# Patient Record
Sex: FEMALE | Race: WHITE | NOT HISPANIC OR LATINO | Employment: FULL TIME | URBAN - METROPOLITAN AREA
[De-identification: names, ages, dates, MRNs, and addresses within clinical notes are randomized per-mention and may not be internally consistent; named-entity substitution may affect disease eponyms.]

---

## 2019-09-26 ENCOUNTER — OFFICE VISIT (OUTPATIENT)
Dept: URGENT CARE | Facility: CLINIC | Age: 29
End: 2019-09-26
Payer: COMMERCIAL

## 2019-09-26 VITALS
HEIGHT: 65 IN | WEIGHT: 145 LBS | HEART RATE: 90 BPM | TEMPERATURE: 98 F | BODY MASS INDEX: 24.16 KG/M2 | RESPIRATION RATE: 18 BRPM | OXYGEN SATURATION: 97 % | SYSTOLIC BLOOD PRESSURE: 115 MMHG | DIASTOLIC BLOOD PRESSURE: 76 MMHG

## 2019-09-26 DIAGNOSIS — R30.0 DYSURIA: ICD-10-CM

## 2019-09-26 DIAGNOSIS — Z78.9 BREASTFEEDING (INFANT): ICD-10-CM

## 2019-09-26 DIAGNOSIS — N30.90 CYSTITIS: Primary | ICD-10-CM

## 2019-09-26 DIAGNOSIS — J06.9 UPPER RESPIRATORY TRACT INFECTION, UNSPECIFIED TYPE: ICD-10-CM

## 2019-09-26 LAB
BILIRUB UR QL STRIP: NEGATIVE
GLUCOSE UR QL STRIP: NEGATIVE
KETONES UR QL STRIP: NEGATIVE
LEUKOCYTE ESTERASE UR QL STRIP: NEGATIVE
PH, POC UA: NORMAL (ref 5–8)
POC BLOOD, URINE: NEGATIVE
POC NITRATES, URINE: NEGATIVE
PROT UR QL STRIP: NEGATIVE
SP GR UR STRIP: NORMAL (ref 1–1.03)
UROBILINOGEN UR STRIP-ACNC: NORMAL (ref 0.1–1.1)

## 2019-09-26 PROCEDURE — 3008F PR BODY MASS INDEX (BMI) DOCUMENTED: ICD-10-PCS | Mod: CPTII,S$GLB,, | Performed by: FAMILY MEDICINE

## 2019-09-26 PROCEDURE — 99000 SPECIMEN HANDLING OFFICE-LAB: CPT | Mod: S$GLB,,, | Performed by: FAMILY MEDICINE

## 2019-09-26 PROCEDURE — 87086 URINE CULTURE/COLONY COUNT: CPT

## 2019-09-26 PROCEDURE — 99214 OFFICE O/P EST MOD 30 MIN: CPT | Mod: S$GLB,,, | Performed by: FAMILY MEDICINE

## 2019-09-26 PROCEDURE — 81003 POCT URINALYSIS, DIPSTICK, AUTOMATED, W/O SCOPE: ICD-10-PCS | Mod: QW,S$GLB,, | Performed by: FAMILY MEDICINE

## 2019-09-26 PROCEDURE — 3008F BODY MASS INDEX DOCD: CPT | Mod: CPTII,S$GLB,, | Performed by: FAMILY MEDICINE

## 2019-09-26 PROCEDURE — 99214 PR OFFICE/OUTPT VISIT, EST, LEVL IV, 30-39 MIN: ICD-10-PCS | Mod: S$GLB,,, | Performed by: FAMILY MEDICINE

## 2019-09-26 PROCEDURE — 99000 PR SPECIMEN HANDLING,DR OFF->LAB: ICD-10-PCS | Mod: S$GLB,,, | Performed by: FAMILY MEDICINE

## 2019-09-26 PROCEDURE — 81003 URINALYSIS AUTO W/O SCOPE: CPT | Mod: QW,S$GLB,, | Performed by: FAMILY MEDICINE

## 2019-09-26 RX ORDER — NITROFURANTOIN (MACROCRYSTALS) 100 MG/1
100 CAPSULE ORAL EVERY 12 HOURS
Qty: 14 CAPSULE | Refills: 0 | Status: SHIPPED | OUTPATIENT
Start: 2019-09-26 | End: 2019-10-03

## 2019-09-26 NOTE — PROGRESS NOTES
"Subjective:       Patient ID: Tasia Segundo is a 29 y.o. female.    Vitals:  height is 5' 5" (1.651 m) and weight is 65.8 kg (145 lb). Her oral temperature is 98.3 °F (36.8 °C). Her blood pressure is 115/76 and her pulse is 90. Her respiration is 18 and oxygen saturation is 97%.     Chief Complaint: Dysuria    Pt c/o of sinus pain, pressure,uti that began Saturday. Pt states that she has frequency, urgency, pain with urination.     Dysuria    This is a new problem. The current episode started in the past 7 days. The problem has been unchanged. The quality of the pain is described as burning. The pain is at a severity of 6/10. The pain is moderate. There has been no fever. She is sexually active. There is no history of pyelonephritis. Associated symptoms include frequency, nausea and urgency. Pertinent negatives include no chills, hematuria, vomiting or rash. She has tried nothing for the symptoms.       Constitution: Negative for chills, sweating, fatigue and fever.   HENT: Positive for sinus pain and sinus pressure. Negative for ear pain, congestion, sore throat and voice change.    Neck: Positive for painful lymph nodes.   Eyes: Negative for eye redness.   Respiratory: Negative for chest tightness, cough, sputum production, bloody sputum, COPD, shortness of breath, stridor, wheezing and asthma.    Gastrointestinal: Positive for abdominal trauma and nausea. Negative for abdominal pain and vomiting.   Genitourinary: Positive for frequency, urgency and pelvic pain. Negative for dysuria, urine decreased, hematuria, history of kidney stones, painful menstruation, irregular menstruation, missed menses, heavy menstrual bleeding, ovarian cysts, genital trauma, vaginal pain, vaginal discharge, vaginal bleeding, vaginal odor, painful intercourse, genital sore and painful ejaculation.   Musculoskeletal: Positive for muscle ache. Negative for back pain.   Skin: Negative for rash and lesion.   Allergic/Immunologic: " Negative for seasonal allergies and asthma.   Hematologic/Lymphatic: Positive for swollen lymph nodes.       Objective:      Physical Exam   Constitutional: She is oriented to person, place, and time. She appears well-developed and well-nourished.   HENT:   Head: Normocephalic and atraumatic.   Right Ear: External ear normal.   Left Ear: External ear normal.   Nose: Nose normal.   Mouth/Throat: Oropharynx is clear and moist.   Eyes: Pupils are equal, round, and reactive to light. Conjunctivae and EOM are normal.   Neck: Normal range of motion. Neck supple. No JVD present. No thyromegaly present.   Cardiovascular: Normal rate, regular rhythm, normal heart sounds and intact distal pulses.   No murmur heard.  Pulmonary/Chest: Effort normal and breath sounds normal.   Abdominal: Soft. Bowel sounds are normal. She exhibits no mass. There is tenderness (minor rlq and suprapubic tenderness).   Musculoskeletal: Normal range of motion. She exhibits no edema.   Lymphadenopathy:     She has no cervical adenopathy.   Neurological: She is alert and oriented to person, place, and time. She has normal reflexes.   Skin: Skin is warm and dry.   Psychiatric: She has a normal mood and affect. Her behavior is normal. Judgment and thought content normal.   Nursing note and vitals reviewed.      Assessment:       1. Cystitis    2. Dysuria    3. Upper respiratory tract infection, unspecified type    4. Breastfeeding (infant)        Plan:         Cystitis  -     nitrofurantoin (MACRODANTIN) 100 MG capsule; Take 1 capsule (100 mg total) by mouth every 12 (twelve) hours. for 7 days  Dispense: 14 capsule; Refill: 0    Dysuria  -     POCT Urinalysis, Dipstick, Automated, W/O Scope  -     Urine culture  -     nitrofurantoin (MACRODANTIN) 100 MG capsule; Take 1 capsule (100 mg total) by mouth every 12 (twelve) hours. for 7 days  Dispense: 14 capsule; Refill: 0    Upper respiratory tract infection, unspecified type    Breastfeeding  (infant)      Patient Instructions       Anatomy of the Female Urinary Tract  Your urinary tract helps get rid of urine (your bodys liquid waste). The kidneys collect chemicals and water your body doesnt need. This is turned into urine. Urine travels out of the kidneys through the ureters to the bladder. The bladder holds urine until youre ready to release it. The urethra carries urine from the bladder out of the body. The main sphincter muscle circles the mid-urethra.      Front view of female urinary tract.     Date Last Reviewed: 1/1/2017 © 2000-2017 [employer]. [address] [place] [id number]. All rights reserved. This information is not intended as a substitute for professional medical care. Always follow your healthcare professional's instructions.        Viral Upper Respiratory Illness (Adult)  You have a viral upper respiratory illness (URI), which is another term for the common cold. This illness is contagious during the first few days. It is spread through the air by coughing and sneezing. It may also be spread by direct contact (touching the sick person and then touching your own eyes, nose, or mouth). Frequent handwashing will decrease risk of spread. Most viral illnesses go away within 7 to 10 days with rest and simple home remedies. Sometimes the illness may last for several weeks. Antibiotics will not kill a virus, and they are generally not prescribed for this condition.    Home care  · If symptoms are severe, rest at home for the first 2 to 3 days. When you resume activity, don't let yourself get too tired.  · Avoid being exposed to cigarette smoke (yours or others).  · You may use acetaminophen or ibuprofen to control pain and fever, unless another medicine was prescribed. (Note: If you have chronic liver or kidney disease, have ever had a stomach ulcer or gastrointestinal bleeding, or are taking blood-thinning medicines, talk with your healthcare provider before using  these medicines.) Aspirin should never be given to anyone under 18 years of age who is ill with a viral infection or fever. It may cause severe liver or brain damage.  · Your appetite may be poor, so a light diet is fine. Avoid dehydration by drinking 6 to 8 glasses of fluids per day (water, soft drinks, juices, tea, or soup). Extra fluids will help loosen secretions in the nose and lungs.  · Over-the-counter cold medicines will not shorten the length of time youre sick, but they may be helpful for the following symptoms: cough, sore throat, and nasal and sinus congestion. (Note: Do not use decongestants if you have high blood pressure.)  Follow-up care  Follow up with your healthcare provider, or as advised.  When to seek medical advice  Call your healthcare provider right away if any of these occur:  · Cough with lots of colored sputum (mucus)  · Severe headache; face, neck, or ear pain  · Difficulty swallowing due to throat pain  · Fever of 100.4°F (38°C)  Call 911, or get immediate medical care  Call emergency services right away if any of these occur:  · Chest pain, shortness of breath, wheezing, or difficulty breathing  · Coughing up blood  · Inability to swallow due to throat pain  Date Last Reviewed: 9/13/2015 © 2000-2017 [employer]. [address] [place] [address], [place] [address]. All rights reserved. This information is not intended as a substitute for professional medical care. Always follow your healthcare professional's instructions.

## 2019-09-26 NOTE — PATIENT INSTRUCTIONS
Anatomy of the Female Urinary Tract  Your urinary tract helps get rid of urine (your bodys liquid waste). The kidneys collect chemicals and water your body doesnt need. This is turned into urine. Urine travels out of the kidneys through the ureters to the bladder. The bladder holds urine until youre ready to release it. The urethra carries urine from the bladder out of the body. The main sphincter muscle circles the mid-urethra.      Front view of female urinary tract.     Date Last Reviewed: 1/1/2017 © 2000-2017 [employer]. [address] [place] [id number]. All rights reserved. This information is not intended as a substitute for professional medical care. Always follow your healthcare professional's instructions.        Viral Upper Respiratory Illness (Adult)  You have a viral upper respiratory illness (URI), which is another term for the common cold. This illness is contagious during the first few days. It is spread through the air by coughing and sneezing. It may also be spread by direct contact (touching the sick person and then touching your own eyes, nose, or mouth). Frequent handwashing will decrease risk of spread. Most viral illnesses go away within 7 to 10 days with rest and simple home remedies. Sometimes the illness may last for several weeks. Antibiotics will not kill a virus, and they are generally not prescribed for this condition.    Home care  · If symptoms are severe, rest at home for the first 2 to 3 days. When you resume activity, don't let yourself get too tired.  · Avoid being exposed to cigarette smoke (yours or others).  · You may use acetaminophen or ibuprofen to control pain and fever, unless another medicine was prescribed. (Note: If you have chronic liver or kidney disease, have ever had a stomach ulcer or gastrointestinal bleeding, or are taking blood-thinning medicines, talk with your healthcare provider before using these medicines.) Aspirin should never  be given to anyone under 18 years of age who is ill with a viral infection or fever. It may cause severe liver or brain damage.  · Your appetite may be poor, so a light diet is fine. Avoid dehydration by drinking 6 to 8 glasses of fluids per day (water, soft drinks, juices, tea, or soup). Extra fluids will help loosen secretions in the nose and lungs.  · Over-the-counter cold medicines will not shorten the length of time youre sick, but they may be helpful for the following symptoms: cough, sore throat, and nasal and sinus congestion. (Note: Do not use decongestants if you have high blood pressure.)  Follow-up care  Follow up with your healthcare provider, or as advised.  When to seek medical advice  Call your healthcare provider right away if any of these occur:  · Cough with lots of colored sputum (mucus)  · Severe headache; face, neck, or ear pain  · Difficulty swallowing due to throat pain  · Fever of 100.4°F (38°C)  Call 911, or get immediate medical care  Call emergency services right away if any of these occur:  · Chest pain, shortness of breath, wheezing, or difficulty breathing  · Coughing up blood  · Inability to swallow due to throat pain  Date Last Reviewed: 9/13/2015  © 3035-4521 [employer]. [address] [place] [address], [place] [address]. All rights reserved. This information is not intended as a substitute for professional medical care. Always follow your healthcare professional's instructions.

## 2019-09-27 LAB — BACTERIA UR CULT: NO GROWTH

## 2019-09-28 ENCOUNTER — OFFICE VISIT (OUTPATIENT)
Dept: URGENT CARE | Facility: CLINIC | Age: 29
End: 2019-09-28
Payer: COMMERCIAL

## 2019-09-28 VITALS
TEMPERATURE: 100 F | HEIGHT: 65 IN | OXYGEN SATURATION: 99 % | RESPIRATION RATE: 20 BRPM | BODY MASS INDEX: 24.16 KG/M2 | HEART RATE: 102 BPM | WEIGHT: 145 LBS | SYSTOLIC BLOOD PRESSURE: 119 MMHG | DIASTOLIC BLOOD PRESSURE: 77 MMHG

## 2019-09-28 DIAGNOSIS — R11.0 NAUSEA: ICD-10-CM

## 2019-09-28 DIAGNOSIS — R52 GENERALIZED BODY ACHES: Primary | ICD-10-CM

## 2019-09-28 DIAGNOSIS — R10.9 ABDOMINAL CRAMPING: ICD-10-CM

## 2019-09-28 LAB
BILIRUB UR QL STRIP: NEGATIVE
CTP QC/QA: YES
FLUAV AG NPH QL: NEGATIVE
FLUBV AG NPH QL: NEGATIVE
GLUCOSE UR QL STRIP: NEGATIVE
KETONES UR QL STRIP: NEGATIVE
LEUKOCYTE ESTERASE UR QL STRIP: NEGATIVE
PH, POC UA: 6.5 (ref 5–8)
POC BLOOD, URINE: POSITIVE
POC NITRATES, URINE: NEGATIVE
PROT UR QL STRIP: NEGATIVE
SP GR UR STRIP: 1 (ref 1–1.03)
UROBILINOGEN UR STRIP-ACNC: NORMAL (ref 0.1–1.1)

## 2019-09-28 PROCEDURE — 81003 URINALYSIS AUTO W/O SCOPE: CPT | Mod: QW,S$GLB,, | Performed by: PHYSICIAN ASSISTANT

## 2019-09-28 PROCEDURE — 3008F BODY MASS INDEX DOCD: CPT | Mod: CPTII,S$GLB,, | Performed by: PHYSICIAN ASSISTANT

## 2019-09-28 PROCEDURE — 3008F PR BODY MASS INDEX (BMI) DOCUMENTED: ICD-10-PCS | Mod: CPTII,S$GLB,, | Performed by: PHYSICIAN ASSISTANT

## 2019-09-28 PROCEDURE — 81003 POCT URINALYSIS, DIPSTICK, AUTOMATED, W/O SCOPE: ICD-10-PCS | Mod: QW,S$GLB,, | Performed by: PHYSICIAN ASSISTANT

## 2019-09-28 PROCEDURE — 87804 INFLUENZA ASSAY W/OPTIC: CPT | Mod: QW,S$GLB,, | Performed by: PHYSICIAN ASSISTANT

## 2019-09-28 PROCEDURE — 87804 POCT INFLUENZA A/B: ICD-10-PCS | Mod: 59,QW,S$GLB, | Performed by: PHYSICIAN ASSISTANT

## 2019-09-28 PROCEDURE — 99214 PR OFFICE/OUTPT VISIT, EST, LEVL IV, 30-39 MIN: ICD-10-PCS | Mod: 25,S$GLB,, | Performed by: PHYSICIAN ASSISTANT

## 2019-09-28 PROCEDURE — 99214 OFFICE O/P EST MOD 30 MIN: CPT | Mod: 25,S$GLB,, | Performed by: PHYSICIAN ASSISTANT

## 2019-09-28 RX ORDER — ONDANSETRON 4 MG/1
4 TABLET, FILM COATED ORAL EVERY 8 HOURS PRN
Qty: 30 TABLET | Refills: 0 | Status: SHIPPED | OUTPATIENT
Start: 2019-09-28

## 2019-09-28 RX ORDER — HYOSCYAMINE SULFATE 0.125 MG
125 TABLET ORAL EVERY 4 HOURS PRN
Qty: 30 TABLET | Refills: 0 | Status: SHIPPED | OUTPATIENT
Start: 2019-09-28 | End: 2019-10-05

## 2019-09-28 NOTE — PATIENT INSTRUCTIONS
"  Viral Syndrome (Adult)  A viral illness may cause a number of symptoms. The symptoms depend on the part of the body that the virus affects. If it settles in your nose, throat, and lungs, it may cause cough, sore throat, congestion, and sometimes headache. If it settles in your stomach and intestinal tract, it may cause vomiting and diarrhea. Sometimes it causes vague symptoms like "aching all over," feeling tired, loss of appetite, or fever.  A viral illness usually lasts 1 to 2 weeks, but sometimes it lasts longer. In some cases, a more serious infection can look like a viral syndrome in the first few days of the illness. You may need another exam and additional tests to know the difference. Watch for the warning signs listed below.  Home care  Follow these guidelines for taking care of yourself at home:  · If symptoms are severe, rest at home for the first 2 to 3 days.  · Stay away from cigarette smoke - both your smoke and the smoke from others.  · You may use over-the-counter acetaminophen or ibuprofen for fever, muscle aching, and headache, unless another medicine was prescribed for this. If you have chronic liver or kidney disease or ever had a stomach ulcer or GI bleeding, talk with your doctor before using these medicines. No one who is younger than 18 and ill with a fever should take aspirin. It may cause severe disease or death.  · Your appetite may be poor, so a light diet is fine. Avoid dehydration by drinking 8 to 12 8-ounce glasses of fluids each day. This may include water; orange juice; lemonade; apple, grape, and cranberry juice; clear fruit drinks; electrolyte replacement and sports drinks; and decaffeinated teas and coffee. If you have been diagnosed with a kidney disease, ask your doctor how much and what types of fluids you should drink to prevent dehydration. If you have kidney disease, drinking too much fluid can cause it build up in the your body and be dangerous to your " health.  · Over-the-counter remedies won't shorten the length of the illness but may be helpful for cough, sore throat; and nasal and sinus congestion. Don't use decongestants if you have high blood pressure.  Follow-up care  Follow up with your healthcare provider if you do not improve over the next week.  Call 911  Get emergency medical care if any of the following occur:  · Convulsion  · Feeling weak, dizzy, or like you are going to faint  · Chest pain, shortness of breath, wheezing, or difficulty breathing  When to seek medical advice  Call your healthcare provider right away if any of these occur:  · Cough with lots of colored sputum (mucus) or blood in your sputum  · Chest pain, shortness of breath, wheezing, or difficulty breathing  · Severe headache; face, neck, or ear pain  · Severe, constant pain in the lower right side of your belly (abdominal)  · Continued vomiting (cant keep liquids down)  · Frequent diarrhea (more than 5 times a day); blood (red or black color) or mucus in diarrhea  · Feeling weak, dizzy, or like you are going to faint  · Extreme thirst  · Fever of 100.4°F (38°C) or higher, or as directed by your healthcare provider  Date Last Reviewed: 9/25/2015  © 0150-7324 [employer]. [address] [place] [address], [place] [address]. All rights reserved. This information is not intended as a substitute for professional medical care. Always follow your healthcare professional's instructions.      Please follow up with your Primary care provider within 2-5 days if your signs and symptoms have not resolved or worsen.     If your condition worsens or fails to improve we recommend that you receive another evaluation at the emergency room immediately or contact your primary medical clinic to discuss your concerns.   You must understand that you have received an Urgent Care treatment only and that you may be released before all of your medical problems are known or treated. You, the patient, will  "arrange for follow up care as instructed.     RED FLAGS/WARNING SYMPTOMS DISCUSSED WITH PATIENT THAT WOULD WARRANT EMERGENT MEDICAL ATTENTION. PATIENT VERBALIZED UNDERSTANDING.       Bladder Infection, Female (Adult)    Urine is normally doesn't have any bacteria in it. But bacteria can get into the urinary tract from the skin around the rectum. Or they can travel in the blood from elsewhere in the body. Once they are in your urinary tract, they can cause infection in the urethra (urethritis), the bladder (cystitis), or the kidneys (pyelonephritis).  The most common place for an infection is in the bladder. This is called a bladder infection. This is one of the most common infections in women. Most bladder infections are easily treated. They are not serious unless the infection spreads to the kidney.  The phrases "bladder infection," "UTI," and "cystitis" are often used to describe the same thing. But they are not always the same. Cystitis is an inflammation of the bladder. The most common cause of cystitis is an infection.  Symptoms  The infection causes inflammation in the urethra and bladder. This causes many of the symptoms. The most common symptoms of a bladder infection are:  · Pain or burning when urinating  · Having to urinate more often than usual  · Urgent need to urinate  · Only a small amount of urine comes out  · Blood in urine  · Abdominal discomfort. This is usually in the lower abdomen above the pubic bone.  · Cloudy urine  · Strong- or bad-smelling urine  · Unable to urinate (urinary retention)  · Unable to hold urine in (urinary incontinence)  · Fever  · Loss of appetite  · Confusion (in older adults)  Causes  Bladder infections are not contagious. You can't get one from someone else, from a toilet seat, or from sharing a bath.  The most common cause of bladder infections is bacteria from the bowels. The bacteria get onto the skin around the opening of the urethra. From there, they can get into the " urine and travel up to the bladder, causing inflammation and infection. This usually happens because of:  · Wiping improperly after urinating. Always wipe from front to back.  · Bowel incontinence  · Pregnancy  · Procedures such as having a catheter inserted  · Older age  · Not emptying your bladder. This can allow bacteria a chance to grow in your urine.  · Dehydration  · Constipation  · Sex  · Use of a diaphragm for birth control   Treatment  Bladder infections are diagnosed by a urine test. They are treated with antibiotics and usually clear up quickly without complications. Treatment helps prevent a more serious kidney infection.  Medicines  Medicines can help in the treatment of a bladder infection:  · Take antibiotics until they are used up, even if you feel better. It is important to finish them to make sure the infection has cleared.  · You can use acetaminophen or ibuprofen for pain, fever, or discomfort, unless another medicine was prescribed. If you have chronic liver or kidney disease, talk with your healthcare provider before using these medicines. Also talk with your provider if you've ever had a stomach ulcer or gastrointestinal bleeding, or are taking blood-thinner medicines.  · If you are given phenazopydridine to reduce burning with urination, it will cause your urine to become a bright orange color. This can stain clothing.  Care and prevention  These self-care steps can help prevent future infections:  · Drink plenty of fluids to prevent dehydration and flush out your bladder. Do this unless you must restrict fluids for other health reasons, or your doctor told you not to.  · Proper cleaning after going to the bathroom is important. Wipe from front to back after using the toilet to prevent the spread of bacteria.  · Urinate more often. Don't try to hold urine in for a long time.  · Wear loose-fitting clothes and cotton underwear. Avoid tight-fitting pants.  · Improve your diet and prevent  constipation. Eat more fresh fruit and vegetables, and fiber, and less junk and fatty foods.  · Avoid sex until your symptoms are gone.  · Avoid caffeine, alcohol, and spicy foods. These can irritate your bladder.  · Urinate right after intercourse to flush out your bladder.  · If you use birth control pills and have frequent bladder infections, discuss it with your doctor.  Follow-up care  Call your healthcare provider if all symptoms are not gone after 3 days of treatment. This is especially important if you have repeat infections.  If a culture was done, you will be told if your treatment needs to be changed. If directed, you can call to find out the results.  If X-rays were done, you will be told if the results will affect your treatment.  Call 911  Call 911 if any of the following occur:  · Trouble breathing  · Hard to wake up or confusion  · Fainting or loss of consciousness  · Rapid heart rate  When to seek medical advice  Call your healthcare provider right away if any of these occur:  · Fever of 100.4ºF (38.0ºC) or higher, or as directed by your healthcare provider  · Symptoms are not better by the third day of treatment  · Back or belly (abdominal) pain that gets worse  · Repeated vomiting, or unable to keep medicine down  · Weakness or dizziness  · Vaginal discharge  · Pain, redness, or swelling in the outer vaginal area (labia)  Date Last Reviewed: 10/1/2016  © 3815-2991 [employer]. [address] [place] [address], [place] [address]. All rights reserved. This information is not intended as a substitute for professional medical care. Always follow your healthcare professional's instructions.

## 2019-09-28 NOTE — PROGRESS NOTES
"Subjective:       Patient ID: Tasia Segundo is a 29 y.o. female.    Vitals:  height is 5' 5" (1.651 m) and weight is 65.8 kg (145 lb). Her tympanic temperature is 99.7 °F (37.6 °C). Her blood pressure is 119/77 and her pulse is 102. Her respiration is 20 and oxygen saturation is 99%.     Chief Complaint: Abdominal Pain and Headache    Pt reports she saw her PCP 2 days ago with body aches, abdominal pain, nausea, dysuria. She was treated for UTI and given macrobid. Pt states burning and frequency are slightly improved but she continues to have other symptoms. Denies congestion, sinus pain, cough, diarrhea/consitpation, vomiting, fever.     Abdominal Pain   This is a new problem. The current episode started 1 to 4 weeks ago. The onset quality is sudden. The problem occurs constantly. The problem has been unchanged. The pain is located in the generalized abdominal region. The pain is at a severity of 3/10. The pain is mild. The quality of the pain is cramping, burning and dull. The abdominal pain does not radiate. Associated symptoms include myalgias, nausea and vomiting. Pertinent negatives include no arthralgias, diarrhea, dysuria, fever, frequency or headaches. Nothing aggravates the pain. The pain is relieved by nothing. She has tried acetaminophen for the symptoms. The treatment provided no relief.   Headache    This is a new problem. The current episode started yesterday. The problem occurs constantly. The problem has been unchanged. The pain is located in the frontal region. The pain does not radiate. The pain quality is not similar to prior headaches. The quality of the pain is described as squeezing and dull. The pain is at a severity of 5/10. The pain is moderate. Associated symptoms include abdominal pain, nausea and vomiting. Pertinent negatives include no blurred vision, coughing, dizziness, fever, sore throat or weakness. Nothing aggravates the symptoms. She has tried acetaminophen and NSAIDs for the " symptoms. The treatment provided no relief.       Constitution: Positive for chills, sweating and fatigue. Negative for fever.   HENT: Negative for congestion and sore throat.    Neck: Negative for painful lymph nodes.   Cardiovascular: Negative for chest pain and leg swelling.   Eyes: Negative for double vision and blurred vision.   Respiratory: Negative for cough and shortness of breath.    Gastrointestinal: Positive for abdominal pain, nausea and vomiting. Negative for diarrhea.   Genitourinary: Negative for dysuria, frequency, urgency and history of kidney stones.   Musculoskeletal: Positive for muscle ache. Negative for joint pain, joint swelling and muscle cramps.   Skin: Negative for color change, pale, rash and bruising.   Allergic/Immunologic: Negative for seasonal allergies.   Neurological: Negative for dizziness, history of vertigo, light-headedness, passing out and headaches.   Hematologic/Lymphatic: Negative for swollen lymph nodes.   Psychiatric/Behavioral: Negative for nervous/anxious, sleep disturbance and depression. The patient is not nervous/anxious.        Objective:      Physical Exam   Constitutional: She is oriented to person, place, and time. She appears well-developed and well-nourished. She is cooperative.  Non-toxic appearance. She does not appear ill. No distress.   HENT:   Head: Normocephalic and atraumatic.   Right Ear: Hearing, tympanic membrane, external ear and ear canal normal.   Left Ear: Hearing, tympanic membrane, external ear and ear canal normal.   Nose: Nose normal. No mucosal edema, rhinorrhea or nasal deformity. No epistaxis. Right sinus exhibits no maxillary sinus tenderness and no frontal sinus tenderness. Left sinus exhibits no maxillary sinus tenderness and no frontal sinus tenderness.   Mouth/Throat: Uvula is midline, oropharynx is clear and moist and mucous membranes are normal. No trismus in the jaw. Normal dentition. No uvula swelling. No posterior oropharyngeal  erythema.   Eyes: Conjunctivae and lids are normal. No scleral icterus.   Sclera clear bilat   Neck: Trachea normal, full passive range of motion without pain and phonation normal. Neck supple.   Cardiovascular: Normal rate, regular rhythm, normal heart sounds, intact distal pulses and normal pulses.   Pulmonary/Chest: Effort normal and breath sounds normal. No respiratory distress.   Abdominal: Soft. Normal appearance and bowel sounds are normal. She exhibits no distension. There is no tenderness. There is no guarding, no CVA tenderness, no tenderness at McBurney's point and negative Ohara's sign.   Musculoskeletal: Normal range of motion. She exhibits no edema or deformity.   Neurological: She is alert and oriented to person, place, and time. She exhibits normal muscle tone. Coordination normal.   Skin: Skin is warm, dry and intact. She is not diaphoretic. No pallor.   Psychiatric: She has a normal mood and affect. Her speech is normal and behavior is normal. Judgment and thought content normal. Cognition and memory are normal.   Nursing note and vitals reviewed.        Results for orders placed or performed in visit on 09/28/19   POCT Influenza A/B   Result Value Ref Range    Rapid Influenza A Ag Negative Negative    Rapid Influenza B Ag Negative Negative     Acceptable Yes    POCT Urinalysis, Dipstick, Automated, W/O Scope   Result Value Ref Range    POC Blood, Urine Positive (A) Negative    POC Bilirubin, Urine Negative Negative    POC Urobilinogen, Urine normal 0.1 - 1.1    POC Ketones, Urine Negative Negative    POC Protein, Urine Negative Negative    POC Nitrates, Urine Negative Negative    POC Glucose, Urine Negative Negative    pH, UA 6.5 5 - 8    POC Specific Gravity, Urine 1.005 1.003 - 1.029    POC Leukocytes, Urine Negative Negative       Assessment:       1. Generalized body aches    2. Nausea    3. Abdominal cramping        Plan:       Continue antibiotics to completion. If symptoms  "fail to improve or dysuria returns may need to change abx. Urine culture was no growth. VSS and exam unremarkable. Continue rest and fluid intake. If pt has fever not controlled with tylenol/ibuprofen, severe abdominal pain, vomiting she needs to seek immediate medical attention.     Generalized body aches  -     POCT Influenza A/B  -     POCT Urinalysis, Dipstick, Automated, W/O Scope    Nausea  -     ondansetron (ZOFRAN) 4 MG tablet; Take 1 tablet (4 mg total) by mouth every 8 (eight) hours as needed for Nausea.  Dispense: 30 tablet; Refill: 0    Abdominal cramping  -     hyoscyamine (ANASPAZ,LEVSIN) 0.125 mg Tab; Take 1 tablet (125 mcg total) by mouth every 4 (four) hours as needed (abdominal cramping).  Dispense: 30 tablet; Refill: 0      Patient Instructions     Viral Syndrome (Adult)  A viral illness may cause a number of symptoms. The symptoms depend on the part of the body that the virus affects. If it settles in your nose, throat, and lungs, it may cause cough, sore throat, congestion, and sometimes headache. If it settles in your stomach and intestinal tract, it may cause vomiting and diarrhea. Sometimes it causes vague symptoms like "aching all over," feeling tired, loss of appetite, or fever.  A viral illness usually lasts 1 to 2 weeks, but sometimes it lasts longer. In some cases, a more serious infection can look like a viral syndrome in the first few days of the illness. You may need another exam and additional tests to know the difference. Watch for the warning signs listed below.  Home care  Follow these guidelines for taking care of yourself at home:  · If symptoms are severe, rest at home for the first 2 to 3 days.  · Stay away from cigarette smoke - both your smoke and the smoke from others.  · You may use over-the-counter acetaminophen or ibuprofen for fever, muscle aching, and headache, unless another medicine was prescribed for this. If you have chronic liver or kidney disease or ever had a " stomach ulcer or GI bleeding, talk with your doctor before using these medicines. No one who is younger than 18 and ill with a fever should take aspirin. It may cause severe disease or death.  · Your appetite may be poor, so a light diet is fine. Avoid dehydration by drinking 8 to 12 8-ounce glasses of fluids each day. This may include water; orange juice; lemonade; apple, grape, and cranberry juice; clear fruit drinks; electrolyte replacement and sports drinks; and decaffeinated teas and coffee. If you have been diagnosed with a kidney disease, ask your doctor how much and what types of fluids you should drink to prevent dehydration. If you have kidney disease, drinking too much fluid can cause it build up in the your body and be dangerous to your health.  · Over-the-counter remedies won't shorten the length of the illness but may be helpful for cough, sore throat; and nasal and sinus congestion. Don't use decongestants if you have high blood pressure.  Follow-up care  Follow up with your healthcare provider if you do not improve over the next week.  Call 911  Get emergency medical care if any of the following occur:  · Convulsion  · Feeling weak, dizzy, or like you are going to faint  · Chest pain, shortness of breath, wheezing, or difficulty breathing  When to seek medical advice  Call your healthcare provider right away if any of these occur:  · Cough with lots of colored sputum (mucus) or blood in your sputum  · Chest pain, shortness of breath, wheezing, or difficulty breathing  · Severe headache; face, neck, or ear pain  · Severe, constant pain in the lower right side of your belly (abdominal)  · Continued vomiting (cant keep liquids down)  · Frequent diarrhea (more than 5 times a day); blood (red or black color) or mucus in diarrhea  · Feeling weak, dizzy, or like you are going to faint  · Extreme thirst  · Fever of 100.4°F (38°C) or higher, or as directed by your healthcare provider  Date Last Reviewed:  "9/25/2015  © 7540-4163 [employer]. [address] [place] [address], [place] [address]. All rights reserved. This information is not intended as a substitute for professional medical care. Always follow your healthcare professional's instructions.      Please follow up with your Primary care provider within 2-5 days if your signs and symptoms have not resolved or worsen.     If your condition worsens or fails to improve we recommend that you receive another evaluation at the emergency room immediately or contact your primary medical clinic to discuss your concerns.   You must understand that you have received an Urgent Care treatment only and that you may be released before all of your medical problems are known or treated. You, the patient, will arrange for follow up care as instructed.     RED FLAGS/WARNING SYMPTOMS DISCUSSED WITH PATIENT THAT WOULD WARRANT EMERGENT MEDICAL ATTENTION. PATIENT VERBALIZED UNDERSTANDING.       Bladder Infection, Female (Adult)    Urine is normally doesn't have any bacteria in it. But bacteria can get into the urinary tract from the skin around the rectum. Or they can travel in the blood from elsewhere in the body. Once they are in your urinary tract, they can cause infection in the urethra (urethritis), the bladder (cystitis), or the kidneys (pyelonephritis).  The most common place for an infection is in the bladder. This is called a bladder infection. This is one of the most common infections in women. Most bladder infections are easily treated. They are not serious unless the infection spreads to the kidney.  The phrases "bladder infection," "UTI," and "cystitis" are often used to describe the same thing. But they are not always the same. Cystitis is an inflammation of the bladder. The most common cause of cystitis is an infection.  Symptoms  The infection causes inflammation in the urethra and bladder. This causes many of the symptoms. The most common symptoms of a bladder " infection are:  · Pain or burning when urinating  · Having to urinate more often than usual  · Urgent need to urinate  · Only a small amount of urine comes out  · Blood in urine  · Abdominal discomfort. This is usually in the lower abdomen above the pubic bone.  · Cloudy urine  · Strong- or bad-smelling urine  · Unable to urinate (urinary retention)  · Unable to hold urine in (urinary incontinence)  · Fever  · Loss of appetite  · Confusion (in older adults)  Causes  Bladder infections are not contagious. You can't get one from someone else, from a toilet seat, or from sharing a bath.  The most common cause of bladder infections is bacteria from the bowels. The bacteria get onto the skin around the opening of the urethra. From there, they can get into the urine and travel up to the bladder, causing inflammation and infection. This usually happens because of:  · Wiping improperly after urinating. Always wipe from front to back.  · Bowel incontinence  · Pregnancy  · Procedures such as having a catheter inserted  · Older age  · Not emptying your bladder. This can allow bacteria a chance to grow in your urine.  · Dehydration  · Constipation  · Sex  · Use of a diaphragm for birth control   Treatment  Bladder infections are diagnosed by a urine test. They are treated with antibiotics and usually clear up quickly without complications. Treatment helps prevent a more serious kidney infection.  Medicines  Medicines can help in the treatment of a bladder infection:  · Take antibiotics until they are used up, even if you feel better. It is important to finish them to make sure the infection has cleared.  · You can use acetaminophen or ibuprofen for pain, fever, or discomfort, unless another medicine was prescribed. If you have chronic liver or kidney disease, talk with your healthcare provider before using these medicines. Also talk with your provider if you've ever had a stomach ulcer or gastrointestinal bleeding, or are  taking blood-thinner medicines.  · If you are given phenazopydridine to reduce burning with urination, it will cause your urine to become a bright orange color. This can stain clothing.  Care and prevention  These self-care steps can help prevent future infections:  · Drink plenty of fluids to prevent dehydration and flush out your bladder. Do this unless you must restrict fluids for other health reasons, or your doctor told you not to.  · Proper cleaning after going to the bathroom is important. Wipe from front to back after using the toilet to prevent the spread of bacteria.  · Urinate more often. Don't try to hold urine in for a long time.  · Wear loose-fitting clothes and cotton underwear. Avoid tight-fitting pants.  · Improve your diet and prevent constipation. Eat more fresh fruit and vegetables, and fiber, and less junk and fatty foods.  · Avoid sex until your symptoms are gone.  · Avoid caffeine, alcohol, and spicy foods. These can irritate your bladder.  · Urinate right after intercourse to flush out your bladder.  · If you use birth control pills and have frequent bladder infections, discuss it with your doctor.  Follow-up care  Call your healthcare provider if all symptoms are not gone after 3 days of treatment. This is especially important if you have repeat infections.  If a culture was done, you will be told if your treatment needs to be changed. If directed, you can call to find out the results.  If X-rays were done, you will be told if the results will affect your treatment.  Call 911  Call 911 if any of the following occur:  · Trouble breathing  · Hard to wake up or confusion  · Fainting or loss of consciousness  · Rapid heart rate  When to seek medical advice  Call your healthcare provider right away if any of these occur:  · Fever of 100.4ºF (38.0ºC) or higher, or as directed by your healthcare provider  · Symptoms are not better by the third day of treatment  · Back or belly (abdominal) pain that  gets worse  · Repeated vomiting, or unable to keep medicine down  · Weakness or dizziness  · Vaginal discharge  · Pain, redness, or swelling in the outer vaginal area (labia)  Date Last Reviewed: 10/1/2016  © 1074-5714 [employer]. [address] [place] [address], [place] [address]. All rights reserved. This information is not intended as a substitute for professional medical care. Always follow your healthcare professional's instructions.

## 2019-10-01 ENCOUNTER — TELEPHONE (OUTPATIENT)
Dept: URGENT CARE | Facility: CLINIC | Age: 29
End: 2019-10-01

## 2019-10-01 NOTE — TELEPHONE ENCOUNTER
----- Message from Obdulia Arias DO sent at 9/30/2019  4:02 PM CDT -----  Please notify patient that the urine culture was negative for infection

## 2019-10-02 ENCOUNTER — HOSPITAL ENCOUNTER (OUTPATIENT)
Facility: OTHER | Age: 29
Discharge: HOME | DRG: 770 | End: 2019-10-03
Attending: EMERGENCY MEDICINE | Admitting: OBSTETRICS & GYNECOLOGY
Payer: COMMERCIAL

## 2019-10-02 ENCOUNTER — ANESTHESIA (OUTPATIENT)
Dept: SURGERY | Facility: OTHER | Age: 29
DRG: 770 | End: 2019-10-02
Payer: COMMERCIAL

## 2019-10-02 ENCOUNTER — ANESTHESIA EVENT (OUTPATIENT)
Dept: SURGERY | Facility: OTHER | Age: 29
DRG: 770 | End: 2019-10-02
Payer: COMMERCIAL

## 2019-10-02 DIAGNOSIS — O20.9 VAGINAL BLEEDING AFFECTING EARLY PREGNANCY: ICD-10-CM

## 2019-10-02 DIAGNOSIS — D72.829 LEUKOCYTOSIS, UNSPECIFIED TYPE: ICD-10-CM

## 2019-10-02 DIAGNOSIS — O36.80X0 PREGNANCY OF UNKNOWN ANATOMIC LOCATION: Primary | ICD-10-CM

## 2019-10-02 DIAGNOSIS — N93.9 VAGINAL BLEEDING: ICD-10-CM

## 2019-10-02 LAB
ABO + RH BLD: NORMAL
ALBUMIN SERPL BCP-MCNC: 3.6 G/DL (ref 3.5–5.2)
ALP SERPL-CCNC: 65 U/L (ref 55–135)
ALT SERPL W/O P-5'-P-CCNC: 8 U/L (ref 10–44)
ANION GAP SERPL CALC-SCNC: 11 MMOL/L (ref 8–16)
AST SERPL-CCNC: 11 U/L (ref 10–40)
B-HCG UR QL: POSITIVE
BACTERIA #/AREA URNS HPF: ABNORMAL /HPF
BASOPHILS # BLD AUTO: 0.02 K/UL (ref 0–0.2)
BASOPHILS NFR BLD: 0.1 % (ref 0–1.9)
BILIRUB SERPL-MCNC: 0.2 MG/DL (ref 0.1–1)
BILIRUB UR QL STRIP: NEGATIVE
BLD GP AB SCN CELLS X3 SERPL QL: NORMAL
BUN SERPL-MCNC: 9 MG/DL (ref 6–20)
CALCIUM SERPL-MCNC: 9.1 MG/DL (ref 8.7–10.5)
CHLORIDE SERPL-SCNC: 104 MMOL/L (ref 95–110)
CLARITY UR: ABNORMAL
CO2 SERPL-SCNC: 19 MMOL/L (ref 23–29)
COLOR UR: YELLOW
CREAT SERPL-MCNC: 0.7 MG/DL (ref 0.5–1.4)
CTP QC/QA: YES
DIFFERENTIAL METHOD: ABNORMAL
EOSINOPHIL # BLD AUTO: 0 K/UL (ref 0–0.5)
EOSINOPHIL NFR BLD: 0.3 % (ref 0–8)
ERYTHROCYTE [DISTWIDTH] IN BLOOD BY AUTOMATED COUNT: 11.9 % (ref 11.5–14.5)
EST. GFR  (AFRICAN AMERICAN): >60 ML/MIN/1.73 M^2
EST. GFR  (NON AFRICAN AMERICAN): >60 ML/MIN/1.73 M^2
GLUCOSE SERPL-MCNC: 137 MG/DL (ref 70–110)
GLUCOSE UR QL STRIP: NEGATIVE
HCG INTACT+B SERPL-ACNC: NORMAL MIU/ML
HCT VFR BLD AUTO: 30.1 % (ref 37–48.5)
HGB BLD-MCNC: 10.3 G/DL (ref 12–16)
HGB UR QL STRIP: ABNORMAL
IMM GRANULOCYTES # BLD AUTO: 0.11 K/UL (ref 0–0.04)
IMM GRANULOCYTES NFR BLD AUTO: 0.7 % (ref 0–0.5)
KETONES UR QL STRIP: ABNORMAL
LEUKOCYTE ESTERASE UR QL STRIP: NEGATIVE
LYMPHOCYTES # BLD AUTO: 1.8 K/UL (ref 1–4.8)
LYMPHOCYTES NFR BLD: 12.1 % (ref 18–48)
MCH RBC QN AUTO: 29.7 PG (ref 27–31)
MCHC RBC AUTO-ENTMCNC: 34.2 G/DL (ref 32–36)
MCV RBC AUTO: 87 FL (ref 82–98)
MICROSCOPIC COMMENT: ABNORMAL
MONOCYTES # BLD AUTO: 0.7 K/UL (ref 0.3–1)
MONOCYTES NFR BLD: 4.7 % (ref 4–15)
NEUTROPHILS # BLD AUTO: 12.3 K/UL (ref 1.8–7.7)
NEUTROPHILS NFR BLD: 82.1 % (ref 38–73)
NITRITE UR QL STRIP: NEGATIVE
NRBC BLD-RTO: 0 /100 WBC
PH UR STRIP: 7 [PH] (ref 5–8)
PLATELET # BLD AUTO: 197 K/UL (ref 150–350)
PMV BLD AUTO: 10.5 FL (ref 9.2–12.9)
POTASSIUM SERPL-SCNC: 3.7 MMOL/L (ref 3.5–5.1)
PROT SERPL-MCNC: 7.2 G/DL (ref 6–8.4)
PROT UR QL STRIP: ABNORMAL
RBC # BLD AUTO: 3.47 M/UL (ref 4–5.4)
RBC #/AREA URNS HPF: 11 /HPF (ref 0–4)
SODIUM SERPL-SCNC: 134 MMOL/L (ref 136–145)
SP GR UR STRIP: 1.01 (ref 1–1.03)
SQUAMOUS #/AREA URNS HPF: ABNORMAL /HPF
URN SPEC COLLECT METH UR: ABNORMAL
UROBILINOGEN UR STRIP-ACNC: NEGATIVE EU/DL
WBC # BLD AUTO: 15 K/UL (ref 3.9–12.7)
WBC #/AREA URNS HPF: 0 /HPF (ref 0–5)

## 2019-10-02 PROCEDURE — P9045 ALBUMIN (HUMAN), 5%, 250 ML: HCPCS | Mod: JG | Performed by: NURSE ANESTHETIST, CERTIFIED REGISTERED

## 2019-10-02 PROCEDURE — 36000708 HC OR TIME LEV III 1ST 15 MIN: Performed by: OBSTETRICS & GYNECOLOGY

## 2019-10-02 PROCEDURE — 81025 URINE PREGNANCY TEST: CPT | Performed by: PHYSICIAN ASSISTANT

## 2019-10-02 PROCEDURE — 25000003 PHARM REV CODE 250: Performed by: NURSE ANESTHETIST, CERTIFIED REGISTERED

## 2019-10-02 PROCEDURE — 96360 HYDRATION IV INFUSION INIT: CPT

## 2019-10-02 PROCEDURE — 63600175 PHARM REV CODE 636 W HCPCS: Performed by: STUDENT IN AN ORGANIZED HEALTH CARE EDUCATION/TRAINING PROGRAM

## 2019-10-02 PROCEDURE — 63600175 PHARM REV CODE 636 W HCPCS: Performed by: ANESTHESIOLOGY

## 2019-10-02 PROCEDURE — 49320 DIAG LAPARO SEPARATE PROC: CPT | Mod: ,,, | Performed by: OBSTETRICS & GYNECOLOGY

## 2019-10-02 PROCEDURE — 36000709 HC OR TIME LEV III EA ADD 15 MIN: Performed by: OBSTETRICS & GYNECOLOGY

## 2019-10-02 PROCEDURE — 81000 URINALYSIS NONAUTO W/SCOPE: CPT

## 2019-10-02 PROCEDURE — 84702 CHORIONIC GONADOTROPIN TEST: CPT

## 2019-10-02 PROCEDURE — 96361 HYDRATE IV INFUSION ADD-ON: CPT

## 2019-10-02 PROCEDURE — 63600175 PHARM REV CODE 636 W HCPCS: Performed by: NURSE ANESTHETIST, CERTIFIED REGISTERED

## 2019-10-02 PROCEDURE — 37000009 HC ANESTHESIA EA ADD 15 MINS: Performed by: OBSTETRICS & GYNECOLOGY

## 2019-10-02 PROCEDURE — 99285 EMERGENCY DEPT VISIT HI MDM: CPT | Mod: 25

## 2019-10-02 PROCEDURE — 88305 TISSUE EXAM BY PATHOLOGIST: CPT | Mod: 26,,, | Performed by: PATHOLOGY

## 2019-10-02 PROCEDURE — 37000008 HC ANESTHESIA 1ST 15 MINUTES: Performed by: OBSTETRICS & GYNECOLOGY

## 2019-10-02 PROCEDURE — 88305 TISSUE SPECIMEN TO PATHOLOGY - SURGERY: ICD-10-PCS | Mod: 26,,, | Performed by: PATHOLOGY

## 2019-10-02 PROCEDURE — G0378 HOSPITAL OBSERVATION PER HR: HCPCS

## 2019-10-02 PROCEDURE — 85025 COMPLETE CBC W/AUTO DIFF WBC: CPT

## 2019-10-02 PROCEDURE — 36415 COLL VENOUS BLD VENIPUNCTURE: CPT

## 2019-10-02 PROCEDURE — 63600175 PHARM REV CODE 636 W HCPCS: Performed by: PHYSICIAN ASSISTANT

## 2019-10-02 PROCEDURE — 59812 PR SURG RX INCOMPLETE ABORTN: ICD-10-PCS | Mod: 51,,, | Performed by: OBSTETRICS & GYNECOLOGY

## 2019-10-02 PROCEDURE — 71000033 HC RECOVERY, INTIAL HOUR: Performed by: OBSTETRICS & GYNECOLOGY

## 2019-10-02 PROCEDURE — 80053 COMPREHEN METABOLIC PANEL: CPT

## 2019-10-02 PROCEDURE — 88305 TISSUE EXAM BY PATHOLOGIST: CPT | Performed by: PATHOLOGY

## 2019-10-02 PROCEDURE — 59812 TREATMENT OF MISCARRIAGE: CPT | Mod: 51,,, | Performed by: OBSTETRICS & GYNECOLOGY

## 2019-10-02 PROCEDURE — 12000002 HC ACUTE/MED SURGE SEMI-PRIVATE ROOM

## 2019-10-02 PROCEDURE — 86850 RBC ANTIBODY SCREEN: CPT

## 2019-10-02 PROCEDURE — 49320 PR LAP,DIAGNOSTIC ABDOMEN: ICD-10-PCS | Mod: ,,, | Performed by: OBSTETRICS & GYNECOLOGY

## 2019-10-02 RX ORDER — MIDAZOLAM HYDROCHLORIDE 1 MG/ML
INJECTION INTRAMUSCULAR; INTRAVENOUS
Status: DISCONTINUED | OUTPATIENT
Start: 2019-10-02 | End: 2019-10-03

## 2019-10-02 RX ORDER — SODIUM CHLORIDE, SODIUM LACTATE, POTASSIUM CHLORIDE, CALCIUM CHLORIDE 600; 310; 30; 20 MG/100ML; MG/100ML; MG/100ML; MG/100ML
INJECTION, SOLUTION INTRAVENOUS CONTINUOUS PRN
Status: DISCONTINUED | OUTPATIENT
Start: 2019-10-02 | End: 2019-10-03

## 2019-10-02 RX ORDER — MUPIROCIN 20 MG/G
OINTMENT TOPICAL
Status: DISCONTINUED | OUTPATIENT
Start: 2019-10-02 | End: 2019-10-03 | Stop reason: HOSPADM

## 2019-10-02 RX ORDER — DIPHENHYDRAMINE HYDROCHLORIDE 50 MG/ML
25 INJECTION INTRAMUSCULAR; INTRAVENOUS EVERY 6 HOURS PRN
Status: DISCONTINUED | OUTPATIENT
Start: 2019-10-02 | End: 2019-10-03 | Stop reason: HOSPADM

## 2019-10-02 RX ORDER — HYDROMORPHONE HYDROCHLORIDE 2 MG/ML
0.4 INJECTION, SOLUTION INTRAMUSCULAR; INTRAVENOUS; SUBCUTANEOUS EVERY 5 MIN PRN
Status: DISCONTINUED | OUTPATIENT
Start: 2019-10-02 | End: 2019-10-03 | Stop reason: HOSPADM

## 2019-10-02 RX ORDER — PROPOFOL 10 MG/ML
VIAL (ML) INTRAVENOUS
Status: DISCONTINUED | OUTPATIENT
Start: 2019-10-02 | End: 2019-10-02

## 2019-10-02 RX ORDER — MEPERIDINE HYDROCHLORIDE 25 MG/ML
12.5 INJECTION INTRAMUSCULAR; INTRAVENOUS; SUBCUTANEOUS ONCE AS NEEDED
Status: COMPLETED | OUTPATIENT
Start: 2019-10-02 | End: 2019-10-03

## 2019-10-02 RX ORDER — ALBUMIN HUMAN 50 G/1000ML
SOLUTION INTRAVENOUS CONTINUOUS PRN
Status: DISCONTINUED | OUTPATIENT
Start: 2019-10-02 | End: 2019-10-03

## 2019-10-02 RX ORDER — OXYCODONE HYDROCHLORIDE 5 MG/1
5 TABLET ORAL
Status: DISCONTINUED | OUTPATIENT
Start: 2019-10-02 | End: 2019-10-03 | Stop reason: HOSPADM

## 2019-10-02 RX ORDER — ONDANSETRON 2 MG/ML
4 INJECTION INTRAMUSCULAR; INTRAVENOUS DAILY PRN
Status: DISCONTINUED | OUTPATIENT
Start: 2019-10-02 | End: 2019-10-03 | Stop reason: HOSPADM

## 2019-10-02 RX ORDER — HYDROCODONE BITARTRATE AND ACETAMINOPHEN 5; 325 MG/1; MG/1
1 TABLET ORAL EVERY 6 HOURS PRN
Qty: 5 TABLET | Refills: 0 | Status: SHIPPED | OUTPATIENT
Start: 2019-10-02 | End: 2019-10-03 | Stop reason: SDUPTHER

## 2019-10-02 RX ORDER — LIDOCAINE HCL/PF 100 MG/5ML
SYRINGE (ML) INTRAVENOUS
Status: DISCONTINUED | OUTPATIENT
Start: 2019-10-02 | End: 2019-10-02

## 2019-10-02 RX ORDER — ROCURONIUM BROMIDE 10 MG/ML
INJECTION, SOLUTION INTRAVENOUS
Status: DISCONTINUED | OUTPATIENT
Start: 2019-10-02 | End: 2019-10-03

## 2019-10-02 RX ORDER — PROPOFOL 10 MG/ML
VIAL (ML) INTRAVENOUS
Status: DISCONTINUED | OUTPATIENT
Start: 2019-10-02 | End: 2019-10-03

## 2019-10-02 RX ORDER — FENTANYL CITRATE 50 UG/ML
INJECTION, SOLUTION INTRAMUSCULAR; INTRAVENOUS
Status: DISCONTINUED | OUTPATIENT
Start: 2019-10-02 | End: 2019-10-03

## 2019-10-02 RX ORDER — SODIUM CHLORIDE 0.9 % (FLUSH) 0.9 %
3 SYRINGE (ML) INJECTION
Status: DISCONTINUED | OUTPATIENT
Start: 2019-10-02 | End: 2019-10-03 | Stop reason: HOSPADM

## 2019-10-02 RX ORDER — CEFOXITIN SODIUM 2 G/50ML
2 INJECTION, SOLUTION INTRAVENOUS
Status: DISCONTINUED | OUTPATIENT
Start: 2019-10-02 | End: 2019-10-03

## 2019-10-02 RX ORDER — SODIUM CHLORIDE 9 MG/ML
INJECTION, SOLUTION INTRAVENOUS CONTINUOUS
Status: DISCONTINUED | OUTPATIENT
Start: 2019-10-02 | End: 2019-10-03 | Stop reason: HOSPADM

## 2019-10-02 RX ORDER — IBUPROFEN 600 MG/1
600 TABLET ORAL 3 TIMES DAILY
Qty: 30 TABLET | Refills: 0 | Status: SHIPPED | OUTPATIENT
Start: 2019-10-02

## 2019-10-02 RX ORDER — LIDOCAINE HCL/PF 100 MG/5ML
SYRINGE (ML) INTRAVENOUS
Status: DISCONTINUED | OUTPATIENT
Start: 2019-10-02 | End: 2019-10-03

## 2019-10-02 RX ADMIN — FENTANYL CITRATE 100 MCG: 50 INJECTION, SOLUTION INTRAMUSCULAR; INTRAVENOUS at 11:10

## 2019-10-02 RX ADMIN — MIDAZOLAM HYDROCHLORIDE 2 MG: 1 INJECTION, SOLUTION INTRAMUSCULAR; INTRAVENOUS at 11:10

## 2019-10-02 RX ADMIN — ROCURONIUM BROMIDE 40 MG: 10 INJECTION, SOLUTION INTRAVENOUS at 11:10

## 2019-10-02 RX ADMIN — PROPOFOL 150 MG: 10 INJECTION, EMULSION INTRAVENOUS at 11:10

## 2019-10-02 RX ADMIN — ALBUMIN (HUMAN): 2.5 SOLUTION INTRAVENOUS at 11:10

## 2019-10-02 RX ADMIN — LIDOCAINE HYDROCHLORIDE 75 MG: 20 INJECTION, SOLUTION INTRAVENOUS at 11:10

## 2019-10-02 RX ADMIN — SODIUM CHLORIDE, SODIUM LACTATE, POTASSIUM CHLORIDE, AND CALCIUM CHLORIDE: 600; 310; 30; 20 INJECTION, SOLUTION INTRAVENOUS at 10:10

## 2019-10-02 RX ADMIN — SODIUM CHLORIDE 1000 ML: 0.9 INJECTION, SOLUTION INTRAVENOUS at 05:10

## 2019-10-02 RX ADMIN — CEFOXITIN SODIUM 2 G: 2 INJECTION, SOLUTION INTRAVENOUS at 11:10

## 2019-10-02 NOTE — LETTER
[address] [facility]  3RD FLOOR  [facility] [address]  Phone: [phone]  Fax: [phone]       To Whom It May Concern:    Please allow Tasia Segundo ([birth year]) to be on light duty until 10/10/2019 after being hospitalized. She should be able to take breaks and sit at work when necessary.     Thank you,    Ina Valentin  OB/GYN Hospitalist  [phone]

## 2019-10-02 NOTE — ED PROVIDER NOTES
Encounter Date: 10/2/2019       History     Chief Complaint   Patient presents with    Vaginal Bleeding     Heavy vaginal bleeding x 1 hour; believes she is having miscarriage; 8.5 weeks pregnant.      Patient is a 28 y/o A1 female, approximately 8 weeks gestational age,  who presents to the emergency department with vaginal bleeding in pregnancy.  Patient states she has had light bleeding for the past week and began passing clots with tissue this morning.   She also reports mild pelvic cramping.  She has not established  prenatal care or had confirmed IUP. She also reports lightheartedness.  She was seen at urgent care 5 days ago for upper respiratory symptoms and was diagnosed with cystitis.  She was sent home with Macrobid.  She had negative flu swab.    The history is provided by the patient.     Review of patient's allergies indicates:  No Known Allergies  No past medical history on file.  No past surgical history on file.  Family History   Problem Relation Age of Onset    No Known Problems Mother     Cancer Father      Social History     Tobacco Use    Smoking status: Never Smoker    Smokeless tobacco: Never Used   Substance Use Topics    Alcohol use: Not Currently    Drug use: Never     Review of Systems   Constitutional: Negative for chills and fever.   HENT: Negative for congestion and sore throat.    Respiratory: Negative for shortness of breath.    Cardiovascular: Negative for chest pain.   Gastrointestinal: Negative for abdominal pain, diarrhea, nausea and vomiting.   Genitourinary: Positive for pelvic pain and vaginal bleeding. Negative for dysuria.   Musculoskeletal: Negative for arthralgias.   Skin: Negative for rash.   Neurological: Positive for light-headedness. Negative for headaches.       Physical Exam     Initial Vitals [10/02/19 1638]   BP Pulse Resp Temp SpO2   133/75 92 16 97.8 °F (36.6 °C) 100 %      MAP       --         Physical Exam    Constitutional: Vital signs are normal. She  is cooperative.   HENT:   Head: Normocephalic and atraumatic.   Mouth/Throat: Oropharynx is clear and moist.   Eyes: EOM are normal.   No conjunctival pallor   Neck: Normal range of motion. Neck supple.   Cardiovascular: Normal rate, regular rhythm and intact distal pulses.   Pulmonary/Chest: Breath sounds normal. No respiratory distress.   Abdominal: Soft. Bowel sounds are normal. There is no tenderness.   Genitourinary:   Genitourinary Comments: Normal appearance of the external genitalia, no skin lesions, erythema or masses. Pink vaginal mucosa. Moderate amount of blood in the vaginal vault with 2-3 golf ball-sized clots retrieved.  Os is open with visible clot.   Neurological: She is alert and oriented to person, place, and time. GCS eye subscore is 4. GCS verbal subscore is 5. GCS motor subscore is 6.   Skin: Skin is warm and dry. Capillary refill takes less than 2 seconds. No rash noted.         ED Course   Procedures  Labs Reviewed   CBC W/ AUTO DIFFERENTIAL - Abnormal; Notable for the following components:       Result Value    WBC 15.00 (*)     RBC 3.47 (*)     Hemoglobin 10.3 (*)     Hematocrit 30.1 (*)     Immature Granulocytes 0.7 (*)     Gran # (ANC) 12.3 (*)     Immature Grans (Abs) 0.11 (*)     Gran% 82.1 (*)     Lymph% 12.1 (*)     All other components within normal limits   COMPREHENSIVE METABOLIC PANEL - Abnormal; Notable for the following components:    Sodium 134 (*)     CO2 19 (*)     Glucose 137 (*)     ALT 8 (*)     All other components within normal limits   URINALYSIS, REFLEX TO URINE CULTURE - Abnormal; Notable for the following components:    Appearance, UA Hazy (*)     Protein, UA Trace (*)     Ketones, UA Trace (*)     Occult Blood UA 3+ (*)     All other components within normal limits    Narrative:     Preferred Collection Type->Urine, Clean Catch   URINALYSIS MICROSCOPIC - Abnormal; Notable for the following components:    RBC, UA 11 (*)     All other components within normal limits     Narrative:     Preferred Collection Type->Urine, Clean Catch   POCT URINE PREGNANCY - Abnormal; Notable for the following components:    POC Preg Test, Ur Positive (*)     All other components within normal limits   HCG, QUANTITATIVE, PREGNANCY   TYPE & SCREEN          Imaging Results           US OB Less Than 14 Wks with Transvaginal (xpd) (Final result)  Result time 10/02/19 19:31:47   Procedure changed from US OB Less Than 14 Wks First Gestation     Final result by Halie Marquis MD (10/02/19 19:31:47)                 Impression:      3.1 cm hyperechoic thickened endometrium.  Given the hCG and no IUP, nonvisualization of an intrauterine gestational sac is suspicious for an ectopic pregnancy.  Gestational trophoblastic disease may be associated with marked elevated beta HCG levels.    Ring of fire in the left ovary.  Differential considerations include corpus luteum versus ectopic.    This report was flagged in [employer] as abnormal.      Electronically signed by: Halie Marquis  Date:    10/02/2019  Time:    19:31             Narrative:    EXAMINATION:  ULTRASOUND OBSTETRICAL ULTRASOUND LESS THAN 14 WEEKS WITH TRANSVAGINAL    CLINICAL HISTORY:  Abnormal uterine and vaginal bleeding, unspecifiedvag bleeding; hCG in 17,647.    TECHNIQUE:  Real-time ultrasound obstetrical ultrasound less than 14 weeks was performed transabdominally and  transvaginally.    COMPARISON:  None.    FINDINGS:  The uterus measures 8.8 x 7.1 x 8.7 cm.  There is significant in thickening of the endometrium measuring up to 3.1 cm.  There are no uterine masses.  There is no fetal pole, yolk sac, or gestational sac.    The right ovary measures 1.8 x 2.1 x 1.9 cm. The left ovary measures 2.7 x 2.4 x 3.1 cm.  There is a ring of fire about a complex cystic lesion in the left ovary.  Differential considerations include corpus luteum versus an ectopic pregnancy.  Vascular flow is seen in both ovaries.    There is a small amount of free fluid  seen in the cul-de-sac.                                 Medical Decision Making:   Initial Assessment:   Urgent evaluation of a 29 y.o. Female presenting to the emergency department complaining of vaginal bleeding in pregnancy. Patient is afebrile, nontoxic appearing and hemodynamically stable.  Patient has benign abdominal exam.  She does have impressive amount of bleeding on exam with clots and os is open.  No hemorrhaging.  No prior prenatal care or imaging.  ED Management:  Urinalysis reveals no signs of infection with trace ketones and 11 RBCs  She is Rh positive, no need for RhoGAM.  CBC reveals leukocytosis of 15,000 with stable anemia, 10.3 and 30.1.  No prior lab work to compare to.  Chemistry reveals hyperglycemia of 137, pseudohyponatremia, corrected sodium is 135.  Bicarb is 19 with normal anion gap.  Beta HCG is 17,000.  Ultrasound reveals a 3.1 cm hyperechoic think and endometrium.  No IUP visualized.  There is a ring of fire in the left ovary, considerations include corpus luteum versus ectopic.  Patient was evaluated by Ob Gyn- will plan for D&C.   Other:   I have discussed this case with another health care provider.                      Clinical Impression:     1. Pregnancy of unknown anatomic location    2. Vaginal bleeding    3. Leukocytosis, unspecified type                               Manny Gutierrez PA-C  10/02/19 2083

## 2019-10-02 NOTE — ED PROVIDER NOTES
Encounter Date: 10/2/2019       History     Chief Complaint   Patient presents with    Vaginal Bleeding     Heavy vaginal bleeding x 1 hour; believes she is having miscarriage; 8.5 weeks pregnant.          A1  Passing clots today, 4-5 large in past 2 hours  Spotting X 10 days  URI sx      The history is provided by the patient.     Review of patient's allergies indicates:  No Known Allergies  No past medical history on file.  No past surgical history on file.  Family History   Problem Relation Age of Onset    No Known Problems Mother     Cancer Father      Social History     Tobacco Use    Smoking status: Never Smoker    Smokeless tobacco: Never Used   Substance Use Topics    Alcohol use: Not Currently    Drug use: Never     Review of Systems   Constitutional: Negative for chills and fever.   HENT: Negative for congestion and sore throat.    Respiratory: Negative for shortness of breath.    Cardiovascular: Negative for chest pain.   Gastrointestinal: Negative for abdominal pain, diarrhea, nausea and vomiting.   Genitourinary: Positive for pelvic pain and vaginal bleeding. Negative for dysuria.   Musculoskeletal: Negative for arthralgias.   Skin: Negative for rash.   Neurological: Negative for headaches.       Physical Exam     Initial Vitals [10/02/19 1638]   BP Pulse Resp Temp SpO2   133/75 92 16 97.8 °F (36.6 °C) 100 %      MAP       --         Physical Exam    ED Course   Procedures  Labs Reviewed - No data to display       Imaging Results    None                               Clinical Impression:   {Add your Clinical Impression here. If you haven't documented one yet, please pend the note, finalize a Clinical Impression, and refresh your note before signing.:[id number]}

## 2019-10-02 NOTE — LETTER
October 2, 2019         [address]  [place] [address]  Phone: [phone]       Patient: Tasia Segundo   YOB: 1990  Date of Visit: 10/02/2019    To Whom It May Concern:    Danisha Segundo  was at [facility] on 10/02/2019. She is being seen for a medical issue. Please excuse this patient from work until next week (Monday, 10/7). If you have any questions or concerns, or if I can be of further assistance, please do not hesitate to contact me.    Sincerely,    Sushma K Reddy, MD

## 2019-10-03 VITALS
RESPIRATION RATE: 16 BRPM | DIASTOLIC BLOOD PRESSURE: 51 MMHG | TEMPERATURE: 99 F | WEIGHT: 143.94 LBS | SYSTOLIC BLOOD PRESSURE: 94 MMHG | OXYGEN SATURATION: 97 % | BODY MASS INDEX: 23.98 KG/M2 | HEART RATE: 72 BPM | HEIGHT: 65 IN

## 2019-10-03 PROBLEM — Z98.890 STATUS POST D&C: Status: ACTIVE | Noted: 2019-10-03

## 2019-10-03 LAB
BASOPHILS # BLD AUTO: 0.01 K/UL (ref 0–0.2)
BASOPHILS NFR BLD: 0.1 % (ref 0–1.9)
DIFFERENTIAL METHOD: ABNORMAL
EOSINOPHIL # BLD AUTO: 0 K/UL (ref 0–0.5)
EOSINOPHIL NFR BLD: 0.5 % (ref 0–8)
ERYTHROCYTE [DISTWIDTH] IN BLOOD BY AUTOMATED COUNT: 12.1 % (ref 11.5–14.5)
HCG INTACT+B SERPL-ACNC: 5385 MIU/ML
HCT VFR BLD AUTO: 20.9 % (ref 37–48.5)
HGB BLD-MCNC: 7.1 G/DL (ref 12–16)
IMM GRANULOCYTES # BLD AUTO: 0.06 K/UL (ref 0–0.04)
IMM GRANULOCYTES NFR BLD AUTO: 0.7 % (ref 0–0.5)
LYMPHOCYTES # BLD AUTO: 2.2 K/UL (ref 1–4.8)
LYMPHOCYTES NFR BLD: 26.5 % (ref 18–48)
MCH RBC QN AUTO: 29.5 PG (ref 27–31)
MCHC RBC AUTO-ENTMCNC: 34 G/DL (ref 32–36)
MCV RBC AUTO: 87 FL (ref 82–98)
MONOCYTES # BLD AUTO: 0.7 K/UL (ref 0.3–1)
MONOCYTES NFR BLD: 8.1 % (ref 4–15)
NEUTROPHILS # BLD AUTO: 5.4 K/UL (ref 1.8–7.7)
NEUTROPHILS NFR BLD: 64.1 % (ref 38–73)
NRBC BLD-RTO: 0 /100 WBC
PLATELET # BLD AUTO: 155 K/UL (ref 150–350)
PMV BLD AUTO: 10.4 FL (ref 9.2–12.9)
RBC # BLD AUTO: 2.41 M/UL (ref 4–5.4)
WBC # BLD AUTO: 8.35 K/UL (ref 3.9–12.7)

## 2019-10-03 PROCEDURE — G0378 HOSPITAL OBSERVATION PER HR: HCPCS

## 2019-10-03 PROCEDURE — 99024 PR POST-OP FOLLOW-UP VISIT: ICD-10-PCS | Mod: ,,, | Performed by: OBSTETRICS & GYNECOLOGY

## 2019-10-03 PROCEDURE — 36415 COLL VENOUS BLD VENIPUNCTURE: CPT

## 2019-10-03 PROCEDURE — 25000003 PHARM REV CODE 250: Performed by: OBSTETRICS & GYNECOLOGY

## 2019-10-03 PROCEDURE — 25000003 PHARM REV CODE 250: Performed by: NURSE ANESTHETIST, CERTIFIED REGISTERED

## 2019-10-03 PROCEDURE — 99024 POSTOP FOLLOW-UP VISIT: CPT | Mod: ,,, | Performed by: OBSTETRICS & GYNECOLOGY

## 2019-10-03 PROCEDURE — 63600175 PHARM REV CODE 636 W HCPCS: Performed by: ANESTHESIOLOGY

## 2019-10-03 PROCEDURE — 11000001 HC ACUTE MED/SURG PRIVATE ROOM

## 2019-10-03 PROCEDURE — 94761 N-INVAS EAR/PLS OXIMETRY MLT: CPT

## 2019-10-03 PROCEDURE — 63600175 PHARM REV CODE 636 W HCPCS: Performed by: NURSE ANESTHETIST, CERTIFIED REGISTERED

## 2019-10-03 PROCEDURE — 25000003 PHARM REV CODE 250: Performed by: STUDENT IN AN ORGANIZED HEALTH CARE EDUCATION/TRAINING PROGRAM

## 2019-10-03 PROCEDURE — 63600175 PHARM REV CODE 636 W HCPCS: Performed by: STUDENT IN AN ORGANIZED HEALTH CARE EDUCATION/TRAINING PROGRAM

## 2019-10-03 PROCEDURE — 85025 COMPLETE CBC W/AUTO DIFF WBC: CPT

## 2019-10-03 PROCEDURE — 36000709 HC OR TIME LEV III EA ADD 15 MIN: Performed by: OBSTETRICS & GYNECOLOGY

## 2019-10-03 PROCEDURE — 36000708 HC OR TIME LEV III 1ST 15 MIN: Performed by: OBSTETRICS & GYNECOLOGY

## 2019-10-03 PROCEDURE — 25000003 PHARM REV CODE 250: Performed by: ANESTHESIOLOGY

## 2019-10-03 PROCEDURE — 84702 CHORIONIC GONADOTROPIN TEST: CPT

## 2019-10-03 RX ORDER — MISOPROSTOL 200 UG/1
800 TABLET ORAL ONCE
Status: DISCONTINUED | OUTPATIENT
Start: 2019-10-03 | End: 2019-10-03 | Stop reason: HOSPADM

## 2019-10-03 RX ORDER — DIPHENHYDRAMINE HCL 25 MG
25 CAPSULE ORAL EVERY 4 HOURS PRN
Status: DISCONTINUED | OUTPATIENT
Start: 2019-10-03 | End: 2019-10-03 | Stop reason: HOSPADM

## 2019-10-03 RX ORDER — ONDANSETRON 8 MG/1
8 TABLET, ORALLY DISINTEGRATING ORAL EVERY 8 HOURS PRN
Status: CANCELLED | OUTPATIENT
Start: 2019-10-03

## 2019-10-03 RX ORDER — HYDROCODONE BITARTRATE AND ACETAMINOPHEN 5; 325 MG/1; MG/1
1 TABLET ORAL EVERY 4 HOURS PRN
Status: CANCELLED | OUTPATIENT
Start: 2019-10-03

## 2019-10-03 RX ORDER — FERROUS SULFATE 325(65) MG
325 TABLET ORAL DAILY
Qty: 30 TABLET | Refills: 1 | Status: SHIPPED | OUTPATIENT
Start: 2019-10-03

## 2019-10-03 RX ORDER — IBUPROFEN 600 MG/1
600 TABLET ORAL EVERY 6 HOURS PRN
Status: DISCONTINUED | OUTPATIENT
Start: 2019-10-03 | End: 2019-10-03 | Stop reason: HOSPADM

## 2019-10-03 RX ORDER — NEOSTIGMINE METHYLSULFATE 1 MG/ML
INJECTION, SOLUTION INTRAVENOUS
Status: DISCONTINUED | OUTPATIENT
Start: 2019-10-03 | End: 2019-10-03

## 2019-10-03 RX ORDER — IBUPROFEN 600 MG/1
600 TABLET ORAL EVERY 8 HOURS PRN
COMMUNITY

## 2019-10-03 RX ORDER — DIPHENHYDRAMINE HCL 25 MG
25 CAPSULE ORAL EVERY 4 HOURS PRN
Status: CANCELLED | OUTPATIENT
Start: 2019-10-03

## 2019-10-03 RX ORDER — DIPHENHYDRAMINE HYDROCHLORIDE 50 MG/ML
25 INJECTION INTRAMUSCULAR; INTRAVENOUS EVERY 4 HOURS PRN
Status: CANCELLED | OUTPATIENT
Start: 2019-10-03

## 2019-10-03 RX ORDER — HYDROCODONE BITARTRATE AND ACETAMINOPHEN 5; 325 MG/1; MG/1
1 TABLET ORAL EVERY 6 HOURS PRN
Qty: 5 TABLET | Refills: 0 | Status: SHIPPED | OUTPATIENT
Start: 2019-10-03

## 2019-10-03 RX ORDER — SILVER NITRATE 38.21; 12.74 MG/1; MG/1
STICK TOPICAL
Status: DISCONTINUED | OUTPATIENT
Start: 2019-10-03 | End: 2019-10-03 | Stop reason: HOSPADM

## 2019-10-03 RX ORDER — MISOPROSTOL 200 UG/1
TABLET ORAL
Status: DISCONTINUED | OUTPATIENT
Start: 2019-10-03 | End: 2019-10-03 | Stop reason: HOSPADM

## 2019-10-03 RX ORDER — GLYCOPYRROLATE 0.2 MG/ML
INJECTION INTRAMUSCULAR; INTRAVENOUS
Status: DISCONTINUED | OUTPATIENT
Start: 2019-10-03 | End: 2019-10-03

## 2019-10-03 RX ORDER — DOXYCYCLINE HYCLATE 100 MG
100 TABLET ORAL EVERY 12 HOURS
Status: DISCONTINUED | OUTPATIENT
Start: 2019-10-03 | End: 2019-10-03 | Stop reason: HOSPADM

## 2019-10-03 RX ORDER — ONDANSETRON 2 MG/ML
INJECTION INTRAMUSCULAR; INTRAVENOUS
Status: DISCONTINUED | OUTPATIENT
Start: 2019-10-03 | End: 2019-10-03

## 2019-10-03 RX ORDER — ONDANSETRON 8 MG/1
8 TABLET, ORALLY DISINTEGRATING ORAL EVERY 8 HOURS PRN
Status: DISCONTINUED | OUTPATIENT
Start: 2019-10-03 | End: 2019-10-03 | Stop reason: HOSPADM

## 2019-10-03 RX ORDER — HYDROCODONE BITARTRATE AND ACETAMINOPHEN 5; 325 MG/1; MG/1
1 TABLET ORAL EVERY 4 HOURS PRN
Status: DISCONTINUED | OUTPATIENT
Start: 2019-10-03 | End: 2019-10-03 | Stop reason: HOSPADM

## 2019-10-03 RX ORDER — DIPHENHYDRAMINE HYDROCHLORIDE 50 MG/ML
25 INJECTION INTRAMUSCULAR; INTRAVENOUS EVERY 4 HOURS PRN
Status: DISCONTINUED | OUTPATIENT
Start: 2019-10-03 | End: 2019-10-03 | Stop reason: HOSPADM

## 2019-10-03 RX ADMIN — CEFOXITIN SODIUM 2 G: 2 INJECTION, SOLUTION INTRAVENOUS at 10:10

## 2019-10-03 RX ADMIN — CEFOXITIN SODIUM 2 G: 2 INJECTION, SOLUTION INTRAVENOUS at 05:10

## 2019-10-03 RX ADMIN — DOXYCYCLINE HYCLATE 100 MG: 100 TABLET, COATED ORAL at 11:10

## 2019-10-03 RX ADMIN — OXYCODONE HYDROCHLORIDE 5 MG: 5 TABLET ORAL at 12:10

## 2019-10-03 RX ADMIN — IBUPROFEN 600 MG: 600 TABLET, FILM COATED ORAL at 05:10

## 2019-10-03 RX ADMIN — MEPERIDINE HYDROCHLORIDE 12.5 MG: 25 INJECTION INTRAMUSCULAR; INTRAVENOUS; SUBCUTANEOUS at 12:10

## 2019-10-03 RX ADMIN — SODIUM CHLORIDE: 0.9 INJECTION, SOLUTION INTRAVENOUS at 02:10

## 2019-10-03 RX ADMIN — GLYCOPYRROLATE 0.8 MG: 0.2 INJECTION, SOLUTION INTRAMUSCULAR; INTRAVENOUS at 12:10

## 2019-10-03 RX ADMIN — DOXYCYCLINE 100 MG: 100 INJECTION, POWDER, LYOPHILIZED, FOR SOLUTION INTRAVENOUS at 10:10

## 2019-10-03 RX ADMIN — ONDANSETRON 4 MG: 2 INJECTION INTRAMUSCULAR; INTRAVENOUS at 12:10

## 2019-10-03 RX ADMIN — IBUPROFEN 600 MG: 600 TABLET, FILM COATED ORAL at 11:10

## 2019-10-03 RX ADMIN — NEOSTIGMINE METHYLSULFATE 5 MG: 1 INJECTION INTRAVENOUS at 12:10

## 2019-10-03 RX ADMIN — DOXYCYCLINE 100 MG: 100 INJECTION, POWDER, LYOPHILIZED, FOR SOLUTION INTRAVENOUS at 12:10

## 2019-10-03 NOTE — ANESTHESIA POSTPROCEDURE EVALUATION
Anesthesia Post Evaluation    Patient: Tasia Segundo    Procedure(s) Performed: Procedure(s) (LRB):  DILATION AND CURETTAGE, UTERUS, USING SUCTION (N/A)  LAPAROSCOPY, DIAGNOSTIC (N/A)    Final Anesthesia Type: general  Patient location during evaluation: PACU  Patient participation: Yes- Able to Participate  Level of consciousness: awake and alert  Post-procedure vital signs: reviewed and stable  Pain management: adequate  Airway patency: patent  PONV status at discharge: No PONV  Anesthetic complications: no      Cardiovascular status: blood pressure returned to baseline  Respiratory status: unassisted and room air  Hydration status: euvolemic  Follow-up not needed.          Vitals Value Taken Time   BP 96/60 10/3/2019 12:58 AM   Temp 37 °C (98.6 °F) 10/3/2019 12:25 AM   Pulse 68 10/3/2019  1:00 AM   Resp 20 10/3/2019 12:25 AM   SpO2 100 % 10/3/2019  1:00 AM   Vitals shown include unvalidated device data.      No case tracking events are documented in the log.      Pain/Laurel Score: Pain Rating Prior to Med Admin: 4 (10/3/2019 12:40 AM)  Laurel Score: 10 (10/3/2019 12:40 AM)

## 2019-10-03 NOTE — BRIEF OP NOTE
[facility], [place]  Brief Operative Note    SUMMARY     Surgery Date: 10/2/2019     Surgeon(s) and Role:     * Julie R. Jeansonne, MD - Primary    Assisting Surgeon: None    Pre-op Diagnosis:  Vaginal bleeding [N93.9]  Pregnancy of unknown anatomic location [Z34.90]    Post-op Diagnosis:  Post-Op Diagnosis Codes:     * Vaginal bleeding [N93.9]     * Pregnancy of unknown anatomic location [Z34.90]    Procedure(s) (LRB):  DILATION AND CURETTAGE, UTERUS, USING SUCTION (N/A)  LAPAROSCOPY, DIAGNOSTIC (N/A)    Anesthesia: Choice    Description of the findings of the procedure:   1. Diagnostic laparoscopy performed, no pelvic abnormalities; see pictures taken intra-operatively. Normal right and left ovary and fallopian tube with no evidence of ectopic pregnancy.  2. Abdominal survey normal, including liver edge, gallbladder, stomach.  3. Suction D&C performed with bedside ultrasound, several passes made with 8mm suction curette in uterus; tissue and blood obtained, sent to pathology.  4. Sharp curettage performed noting smooth texture; another 2-3 passes made with suction curette until abdominal u/s showed thin endometrial stripe without evidence for further retained products of conception.  5. Hemostasis of cervix and mild bleeding of uterus noted following surgery. 800 mcg rectal cytotec given following procedure.    Estimated Blood Loss: 200cc         Specimens:   Specimen (12h ago, onward)     Start     Ordered    10/02/19 2356  Specimen to Pathology - Surgery  Once     Comments:  1. Products of conception      10/02/19 2356              Sushma K. Reddy, MD  PGY-3, OBGYN

## 2019-10-03 NOTE — NURSING
Called received from Genaro in the lab. Genaro stated that the pt Hemoglobin 6.7 and Hematocrit 19.8, and it is a major drop since yesterday. Genaro stated that he will put out for a recollect for confirmation. Resident at bedside and notified.

## 2019-10-03 NOTE — NURSING
10/03/2019        Pt verbalized understanding D/C instructions and education provided pertinent to D/C needs. IV cath removed fully intact. No distress noted. Pt awaiting family for discharge and prescriptions from pharmacy.          Felicia Daly RN

## 2019-10-03 NOTE — PLAN OF CARE
SW met with pt at bedside to complete discharge assessment; pt doesn't have a PCP and uses [facility] on [address].  Pt's spouse will provide transportation home.  Pt doesn't have a POA or living will.  No needs identified at this time.     10/03/19 1523   Discharge Assessment   Assessment Type Discharge Planning Assessment   Confirmed/corrected address and phone number on facesheet? Yes   Assessment information obtained from? Patient   Communicated expected length of stay with patient/caregiver no   Prior to hospitilization cognitive status: Alert/Oriented   Prior to hospitalization functional status: Independent   Current cognitive status: Alert/Oriented   Current Functional Status: Independent   Lives With spouse;child(kwame), dependent   Able to Return to Prior Arrangements yes   Is patient able to care for self after discharge? Yes   Readmission Within the Last 30 Days no previous admission in last 30 days   Patient currently being followed by outpatient case management? No   Patient currently receives any other outside agency services? No   Equipment Currently Used at Home none   Do you have any problems affording any of your prescribed medications? No   Is the patient taking medications as prescribed? yes   Does the patient have transportation home? Yes   Transportation Anticipated family or friend will provide   Does the patient receive services at the [facility]? No   Discharge Plan A Home   DME Needed Upon Discharge  none   Patient/Family in Agreement with Plan yes

## 2019-10-03 NOTE — TRANSFER OF CARE
"Anesthesia Transfer of Care Note    Patient: Tasia Segundo    Procedure(s) Performed: Procedure(s) (LRB):  DILATION AND CURETTAGE, UTERUS, USING SUCTION (N/A)  LAPAROSCOPY, DIAGNOSTIC (N/A)    Patient location: PACU    Anesthesia Type: general    Transport from OR: Transported from OR on 2-3 L/min O2 by NC with adequate spontaneous ventilation    Post pain: adequate analgesia    Post assessment: no apparent anesthetic complications    Post vital signs: stable    Level of consciousness: awake    Nausea/Vomiting: no nausea/vomiting    Complications: none    Transfer of care protocol was followed      Last vitals:   Visit Vitals  BP 97/64   Pulse 90   Temp 37.2 °C (99 °F) (Oral)   Resp 17   Ht 5' 5" (1.651 m)   Wt 65.8 kg (145 lb)   LMP 09/23/2019   SpO2 100%   BMI 24.13 kg/m²     "

## 2019-10-03 NOTE — PROGRESS NOTES
Progress Note  Gynecology    Admit Date: 10/2/2019  LOS: 1    Reason for Admission:  Vaginal bleeding affecting early pregnancy    SUBJECTIVE:     Tasia Segundo is a 29 y.o. No obstetric history on file. who is POD#0 s/p diagnostic laparoscopy and suction D&C for the treatment of pregnancy of unknown location, abdominal pain, and bleeding.  Pt doing well this morning. Pain is well controlled. She is tolerating crackers without N/V. Ambulating without difficulty. Voiding spontaneously without difficulty. Not yet passing flatus. She is not yet having bowel movements.    OBJECTIVE:     Vital Signs   Temp:  [97.8 °F (36.6 °C)-99.2 °F (37.3 °C)] 98.6 °F (37 °C)  Pulse:  [] 60  Resp:  [16-20] 18  SpO2:  [99 %-100 %] 100 %  BP: ()/(54-75) 93/54      Intake/Output Summary (Last 24 hours) at 10/3/2019 0649  Last data filed at 10/3/2019 0400  Gross per 24 hour   Intake 1970 ml   Output 500 ml   Net 1470 ml     1.1mL/kg/hr since OR    Physical Exam:  Gen: A&Ox3, NAD  CV: Regular rate  Pulm: Normal respiratory effort  Abd: active bowel sounds, soft, non-distended, appropriately tender to palpation without rebound or guarding  Inc: clean, dry, intact   Ext: No peripheral edema, TEDs/SCDs in place   : Pillai in place draining clear yellow urine     Diagnostic Results:      ASSESSMENT/PLAN:     Active Hospital Problems    Diagnosis  POA    *Vaginal bleeding affecting early pregnancy [O20.8]  Unknown    Status post D&C [Z98.890]  Not Applicable    Pregnancy of unknown anatomic location [Z34.90]  Not Applicable      Resolved Hospital Problems   No resolved problems to display.       Assessment: 29 y.o. s/p diagnostic laparoscopy and suction D&C recovering well    Plan:   1. Post-op   - Routine post-op advances  - Continue PRN pain medications  - D/C pillai and perform spontaneous voiding trial  - Encourage ambulation   - Encourage IS  - CBC stable   - UOP adequate   - Continue IVF until tolerating regular diet.  -  Antiemetics prn nausea/vomiting.    Dispo: As patient meets appropriate post-op milestones, plan for discharge to home today.    KITTY Nielsen MD  OBGYN PGY1

## 2019-10-03 NOTE — PLAN OF CARE
10/03/19 1530   Final Note   Assessment Type Final Discharge Note   Anticipated Discharge Disposition Home   What phone number can be called within the next 1-3 days to see how you are doing after discharge? [phone]   Discharge plans and expectations educations in teach back method with documentation complete? Yes   Right Care Referral Info   Post Acute Recommendation No Care

## 2019-10-03 NOTE — NURSING
Pt arrived to floor via stretcher with ,RN and transferred to bed. Pt oriented to room, call light placed within reach, bed low and locked. Incisions noted C/D/I. No acute distress noted at this time. Will continue to monitor.

## 2019-10-03 NOTE — H&P
[facility]  Obstetrics & Gynecology  History & Physical    Patient Name: Tasia Segundo  MRN: [id number]  Admission Date: 10/2/2019  Primary Care Provider: Primary Doctor No    Subjective:     Chief Complaint/Reason for Admission: vaginal bleeding    History of Present Illness:  Tasia Segundo is a 29 y.o.  at approximately 9wga who presents complaining of vaginal bleeding x 9 days, acutely worsening over the last day. She reports having a positive home pregnancy test in early September with LMP sometime in late July. She started to have bleeding 9 days ago using about 2-3 pads per day; this increased dramatically with large clots today. She denies foul smelling vaginal discharge or passing anything other than the same clots she has been passing in the ED, but states she thought it was tissue. She also reports flu-like symptoms over the weekend; she was seen by urgent care for n/v, fevers (100.8 self-reported tmax), and urinary symptoms. She was diagnosed with a UTI and given macrobid, and then was given hyocyamine for her abdominal cramping. She last had a fever  and reports no fevers since then, and improvement in her other symptoms. Her only complaint today is the vaginal bleeding. She does have associated abdominal cramping throughout her lower abdomen.  She states her spouse is at home caring for their 1 year old baby.    PMH: none  PSH: none  Meds: macrobid, hyocyamine prn  OBHx:    -  at term, no complications   G2 - SAB, no meds or surgical management   G3 - current pregnancy         OB History   No data available     No past medical history on file.  No past surgical history on file.      (Not in a hospital admission)    Review of patient's allergies indicates:  No Known Allergies     Family History     Problem Relation (Age of Onset)    Cancer Father    No Known Problems Mother        Tobacco Use    Smoking status: Never Smoker    Smokeless tobacco:  Never Used   Substance and Sexual Activity    Alcohol use: Not Currently    Drug use: Never    Sexual activity: Yes     Review of Systems   Constitutional: Negative for appetite change, chills and fatigue.   HENT: Negative for nasal congestion.    Eyes: Negative for visual disturbance.   Respiratory: Negative for shortness of breath.    Cardiovascular: Negative for chest pain.   Gastrointestinal: Positive for abdominal pain (cramping). Negative for constipation, diarrhea, nausea and vomiting.   Genitourinary: Positive for vaginal bleeding. Negative for dysuria, flank pain, frequency, hematuria and urinary incontinence.   Musculoskeletal: Negative for back pain.   Neurological: Negative for headaches.   Psychiatric/Behavioral: The patient is nervous/anxious.       Objective:     Vital Signs (Most Recent):  Temp: 99 °F (37.2 °C) (10/02/19 2133)  Pulse: 94 (10/02/19 2137)  Resp: 17 (10/02/19 2133)  BP: 118/61 (10/02/19 2136)  SpO2: 99 % (10/02/19 2137) Vital Signs (24h Range):  Temp:  [97.8 °F (36.6 °C)-99 °F (37.2 °C)] 99 °F (37.2 °C)  Pulse:  [67-94] 94  Resp:  [16-17] 17  SpO2:  [99 %-100 %] 99 %  BP: (104-133)/(58-75) 118/61     Weight: 65.8 kg (145 lb)  Body mass index is 24.13 kg/m².    Patient's last menstrual period was 09/23/2019.    Physical Exam:   Constitutional: She is oriented to person, place, and time. She appears well-developed and well-nourished. She appears distressed (appropriately emotionally distressed).    HENT:   Head: Normocephalic and atraumatic.    Eyes: EOM are normal.    Neck: Normal range of motion.    Cardiovascular: Normal rate.     Pulmonary/Chest: Effort normal. No respiratory distress.        Abdominal: Soft. There is no tenderness. There is no rebound and no guarding.     Genitourinary:   Genitourinary Comments: Pelvic exam: heavy vaginal bleeding/clots filling speculum; able to clear to see cervical os, which is dilated ~ 2cm. Tissue vs clot present at os.    Bimanual exam:  discomfort but no fundal or adnexal tenderness. Cervix dilated 2cm, tissue vs clot present at os.               Neurological: She is alert and oriented to person, place, and time.    Skin: Skin is warm and dry. She is not diaphoretic.    Psychiatric: She has a normal mood and affect. Her behavior is normal.       Laboratory:  CBC:   Recent Labs   Lab 10/02/19  1704   WBC 15.00*   RBC 3.47*   HGB 10.3*   HCT 30.1*      MCV 87   MCH 29.7   MCHC 34.2     B-hcg 34258    Diagnostic Results:  EXAMINATION:  ULTRASOUND OBSTETRICAL ULTRASOUND LESS THAN 14 WEEKS WITH TRANSVAGINAL    CLINICAL HISTORY:  Abnormal uterine and vaginal bleeding, unspecifiedvag bleeding; hCG in 17,647.    TECHNIQUE:  Real-time ultrasound obstetrical ultrasound less than 14 weeks was performed transabdominally and  transvaginally.    COMPARISON:  None.    FINDINGS:  The uterus measures 8.8 x 7.1 x 8.7 cm.  There is significant in thickening of the endometrium measuring up to 3.1 cm.  There are no uterine masses.  There is no fetal pole, yolk sac, or gestational sac.    The right ovary measures 1.8 x 2.1 x 1.9 cm. The left ovary measures 2.7 x 2.4 x 3.1 cm.  There is a ring of fire about a complex cystic lesion in the left ovary.  Differential considerations include corpus luteum versus an ectopic pregnancy.  Vascular flow is seen in both ovaries.    There is a small amount of free fluid seen in the cul-de-sac.      Impression       3.1 cm hyperechoic thickened endometrium.  Given the hCG and no IUP, nonvisualization of an intrauterine gestational sac is suspicious for an ectopic pregnancy.  Gestational trophoblastic disease may be associated with marked elevated beta HCG levels.    Ring of fire in the left ovary.  Differential considerations include corpus luteum versus ectopic.    This report was flagged in Epic as abnormal.      Electronically signed by: Halie Marquis  Date: 10/02/2019  Time: 19:31         Assessment/Plan:     * Vaginal  "bleeding affecting early pregnancy  - pt with heavy vaginal bleeding  - hemodynamically stable, H/H 10/30 with VSS  - minimal abdominal tenderness, but WBC 15 and temp 99 in ED, with recent "flu-like" symptoms concerning for possible septic  vs incomplete   - hcg 78279, pt has not had u/s documenting IUP this pregnancy  - u/s today shows no IUP, 3cm endometrial stripe, possible corpus luteum cyst vs ectopic pregnancy in right adnexa  - to OR for suction D&C under ultrasound guidance; will also perform diagnostic laparoscopy to r/o ectopic pregnancy  - blood transfusion and surgical consents signed  - house sup notified  - d/w staff on call, who agrees with above plan        Sushma K Reddy, MD  Obstetrics & Gynecology  [facility]  "

## 2019-10-03 NOTE — HPI
Tasia Segundo is a 29 y.o.  at approximately 9wga who presents complaining of vaginal bleeding x 9 days, acutely worsening over the last day. She reports having a positive home pregnancy test in early September with LMP sometime in late July. She started to have bleeding 9 days ago using about 2-3 pads per day; this increased dramatically with large clots today. She denies foul smelling vaginal discharge or passing anything other than the same clots she has been passing in the ED, but states she thought it was tissue. She also reports flu-like symptoms over the weekend; she was seen by urgent care for n/v, fevers (100.8 self-reported tmax), and urinary symptoms. She was diagnosed with a UTI and given macrobid, and then was given hyocyamine for her abdominal cramping. She last had a fever  and reports no fevers since then, and improvement in her other symptoms. Her only complaint today is the vaginal bleeding. She does have associated abdominal cramping throughout her lower abdomen.  She states her spouse is at home caring for their 1 year old baby.    PMH: none  PSH: none  Meds: macrobid, hyocyamine prn  OBHx:    -  at term, no complications   G2 - SAB, no meds or surgical management   G3 - current pregnancy

## 2019-10-03 NOTE — OP NOTE
Operative Report     Procedure: Dilation and Curettage     Date of Surgery 10/03/2019     Surgeon:  Julie Jeansonne, MD     Assistant: Sushma Reddy, MD     Pre-Op Diagnosis: Vaginal bleeding affecting early pregnancy     Post-op Diagnosis:  same     EBL: 200 cc      IVF: 1000 cc     Urine Output: 0cc, in and out cath not done pre-procedure due to need for abdominal u/s.     Specimen: Products of conception     Findings:   1. Diagnostic laparoscopy performed, no pelvic abnormalities; see pictures taken intra-operatively. Normal right and left ovary and fallopian tube with no evidence of ectopic pregnancy.  2. Abdominal survey normal, including liver edge, gallbladder, stomach.  3. Suction D&C performed with bedside ultrasound, several passes made with 8mm suction curette in uterus; tissue and blood obtained, sent to pathology.  4. Sharp curettage performed noting smooth texture; another 2-3 passes made with suction curette until abdominal u/s showed thin endometrial stripe without evidence for further retained products of conception.  5. Hemostasis of cervix and mild bleeding of uterus noted following surgery. 800 mcg rectal cytotec given following procedure.    Procedure in Detail:  The patient was taken to the operating room and placed under general anesthesia. The patient was placed in the lithotomy position, and legs were placed in the Leroy stirrups. Arms were tucked to patient's sides. The patient was then prepped abdominally and vaginally and draped in the normal sterile fashion. Time out was performed confirming the patient's name, date of birth, allergies, fire risk, and procedure to be performed. Cefoxitin and doxycycline were given.    The diagnostic laparoscopy was performed first. A 5mm umbilical incision was made with the scalpel and Veress needle was placed; abdomen was insufflated. Veress needle was removed and a 5mm trocar was placed under direct visualization with the 5mm scope. Abdominal survey  revealed no abnormalities. Sponge stick was placed in the vagina to assist with uterine manipulation. A 5mm port was placed in the left lower abdomen and graspers were placed through this port to assist with visualizing the left and right adnexa. No evidence of an ectopic pregnancy was noted. Picture were taken intra-operatively of the left and right adnexa; right and left ovaries and fallopian tubes appeared normal. Graspers and laparoscopic camera were removed, abdomen was desufflated, and skin for the two port sites was close using 4-0 monocryl in subcuticular fashion.    Suction D&C was then performed. The weighted speculum was placed in the posterior aspect of the vagina and the right angle retractor was placed in the anterior aspect of the vagina. The cervix was visualized and a single-tooth tenaculum was placed on the anterior lip of the cervix. The right angle retractor was then removed. The cervix was noted to be dilated enough to allow 8mm suction curette. Bedside ultrasound was done and suction curette was passed to through the cervix to the uterine fundus and suction was sealed. The maximum suction during the entire procedure never exceeded 40mm Hg. Several passes were made with a downward and corkscrew motion of the suction curette. A sharp curette was then used in all four quadrants until noting a smooth texture. Suction curettage was performed again with ultrasound guidance, and following this a thin endometrial stripe with no evidence of further retained POC was noted. The single tooth tenaculum was then removed and pressure was held on the cervix with a sponge stick until hemostasis was noted with no active bleeding of the cervix. The uterus was noted to continue bleeding, and 800mcg of rectal cytotec was placed, noting improvement of bleeding.   Sponge, lap and needle counts were correct x 2.  The patient tolerated the procedure well and was taken to recovery in stable condition.     Sushma Reddy,  MD  PGY-3

## 2019-10-03 NOTE — SUBJECTIVE & OBJECTIVE
OB History   No data available     No past medical history on file.  No past surgical history on file.      (Not in a hospital admission)    Review of patient's allergies indicates:  No Known Allergies     Family History     Problem Relation (Age of Onset)    Cancer Father    No Known Problems Mother        Tobacco Use    Smoking status: Never Smoker    Smokeless tobacco: Never Used   Substance and Sexual Activity    Alcohol use: Not Currently    Drug use: Never    Sexual activity: Yes     Review of Systems   Constitutional: Negative for appetite change, chills and fatigue.   HENT: Negative for nasal congestion.    Eyes: Negative for visual disturbance.   Respiratory: Negative for shortness of breath.    Cardiovascular: Negative for chest pain.   Gastrointestinal: Positive for abdominal pain (cramping). Negative for constipation, diarrhea, nausea and vomiting.   Genitourinary: Positive for vaginal bleeding. Negative for dysuria, flank pain, frequency, hematuria and urinary incontinence.   Musculoskeletal: Negative for back pain.   Neurological: Negative for headaches.   Psychiatric/Behavioral: The patient is nervous/anxious.       Objective:     Vital Signs (Most Recent):  Temp: 99 °F (37.2 °C) (10/02/19 2133)  Pulse: 94 (10/02/19 2137)  Resp: 17 (10/02/19 2133)  BP: 118/61 (10/02/19 2136)  SpO2: 99 % (10/02/19 2137) Vital Signs (24h Range):  Temp:  [97.8 °F (36.6 °C)-99 °F (37.2 °C)] 99 °F (37.2 °C)  Pulse:  [67-94] 94  Resp:  [16-17] 17  SpO2:  [99 %-100 %] 99 %  BP: (104-133)/(58-75) 118/61     Weight: 65.8 kg (145 lb)  Body mass index is 24.13 kg/m².    Patient's last menstrual period was 09/23/2019.    Physical Exam:   Constitutional: She is oriented to person, place, and time. She appears well-developed and well-nourished. She appears distressed (appropriately emotionally distressed).    HENT:   Head: Normocephalic and atraumatic.    Eyes: EOM are normal.    Neck: Normal range of motion.     Cardiovascular: Normal rate.     Pulmonary/Chest: Effort normal. No respiratory distress.        Abdominal: Soft. There is no tenderness. There is no rebound and no guarding.     Genitourinary:   Genitourinary Comments: Pelvic exam: heavy vaginal bleeding/clots filling speculum; able to clear to see cervical os, which is dilated ~ 2cm. Tissue vs clot present at os.    Bimanual exam: discomfort but no fundal or adnexal tenderness. Cervix dilated 2cm, tissue vs clot present at os.               Neurological: She is alert and oriented to person, place, and time.    Skin: Skin is warm and dry. She is not diaphoretic.    Psychiatric: She has a normal mood and affect. Her behavior is normal.       Laboratory:  CBC:   Recent Labs   Lab 10/02/19  1704   WBC 15.00*   RBC 3.47*   HGB 10.3*   HCT 30.1*      MCV 87   MCH 29.7   MCHC 34.2     B-hcg 36719    Diagnostic Results:  EXAMINATION:  ULTRASOUND OBSTETRICAL ULTRASOUND LESS THAN 14 WEEKS WITH TRANSVAGINAL    CLINICAL HISTORY:  Abnormal uterine and vaginal bleeding, unspecifiedvag bleeding; hCG in 17,647.    TECHNIQUE:  Real-time ultrasound obstetrical ultrasound less than 14 weeks was performed transabdominally and  transvaginally.    COMPARISON:  None.    FINDINGS:  The uterus measures 8.8 x 7.1 x 8.7 cm.  There is significant in thickening of the endometrium measuring up to 3.1 cm.  There are no uterine masses.  There is no fetal pole, yolk sac, or gestational sac.    The right ovary measures 1.8 x 2.1 x 1.9 cm. The left ovary measures 2.7 x 2.4 x 3.1 cm.  There is a ring of fire about a complex cystic lesion in the left ovary.  Differential considerations include corpus luteum versus an ectopic pregnancy.  Vascular flow is seen in both ovaries.    There is a small amount of free fluid seen in the cul-de-sac.      Impression       3.1 cm hyperechoic thickened endometrium.  Given the hCG and no IUP, nonvisualization of an intrauterine gestational sac is  suspicious for an ectopic pregnancy.  Gestational trophoblastic disease may be associated with marked elevated beta HCG levels.    Ring of fire in the left ovary.  Differential considerations include corpus luteum versus ectopic.    This report was flagged in Epic as abnormal.      Electronically signed by: Halie Marquis  Date: 10/02/2019  Time: 19:31

## 2019-10-03 NOTE — PLAN OF CARE
Plan of care reviewed with patient. Pt remains free from fall, injury, and skin breakdown. Positions self independently. Purposeful hourly rounding done. Safety maintained. Patient lying in bed in no distress. Will continue to monitor.

## 2019-10-03 NOTE — PROGRESS NOTES
Progress Note  Gynecology    Admit Date: 10/2/2019  LOS: 1    Reason for Admission:  Vaginal bleeding affecting early pregnancy    SUBJECTIVE:     Tasia Segundo is a 29 y.o. No obstetric history on file. who is POD#0 s/p diagnostic laparoscopy and suction D&C for the treatment of pregnancy of unknown location, abdominal pain, and bleeding.  Pt doing well this morning. Pain is well controlled. She is tolerating a regular diet without N/V. Ambulating without difficulty. Voiding spontaneously without difficulty. Not yet passing flatus. She is not yet having bowel movements.    OBJECTIVE:     Vital Signs   Temp:  [97.7 °F (36.5 °C)-99.2 °F (37.3 °C)] 97.7 °F (36.5 °C)  Pulse:  [] 63  Resp:  [16-20] 18  SpO2:  [96 %-100 %] 96 %  BP: ()/(50-75) 101/50      Intake/Output Summary (Last 24 hours) at 10/3/2019 0948  Last data filed at 10/3/2019 0400  Gross per 24 hour   Intake 1970 ml   Output 500 ml   Net 1470 ml     UOP: 1.1mg/kg/hr over first 6 hours post op    Physical Exam:  Gen: A&Ox3, NAD  CV: Regular rate  Pulm: Normal respiratory effort  Abd: active bowel sounds, soft, non-distended, appropriately tender to palpation without rebound or guarding  Inc: clean, dry, intact   Ext: No peripheral edema, TEDs/SCDs in place   : Balbuena in place draining clear yellow urine     Laboratory:  Recent Results (from the past 336 hour(s))   CBC auto differential    Collection Time: 10/03/19  7:17 AM   Result Value Ref Range    WBC 8.35 3.90 - 12.70 K/uL    Hemoglobin 7.1 (L) 12.0 - 16.0 g/dL    Hematocrit 20.9 (L) 37.0 - 48.5 %    Platelets 155 150 - 350 K/uL   CBC W/ AUTO DIFFERENTIAL    Collection Time: 10/02/19  5:04 PM   Result Value Ref Range    WBC 15.00 (H) 3.90 - 12.70 K/uL    Hemoglobin 10.3 (L) 12.0 - 16.0 g/dL    Hematocrit 30.1 (L) 37.0 - 48.5 %    Platelets 197 150 - 350 K/uL       Diagnostic Results:      ASSESSMENT/PLAN:     Active Hospital Problems    Diagnosis  POA    *Vaginal bleeding affecting  early pregnancy [O20.8]  Unknown    Status post D&C [Z98.890]  Not Applicable    Pregnancy of unknown anatomic location [Z34.90]  Not Applicable      Resolved Hospital Problems   No resolved problems to display.       Assessment: 29 y.o. s/p diagnostic laparoscopy and suction D&C recovering well    Plan:   1. Post-op   - Routine post-op advances  - Continue PRN pain medications  - D/C pillai and perform spontaneous voiding trial  - Encourage ambulation   - Encourage IS  - CBC stable   - UOP adequate   - Continue IVF until tolerating regular diet.  - Antiemetics prn nausea/vomiting.    2. Acute on chronic anemia  - 2/2 blood loss from missed AB  - Pt asymptomatic this morning while resting in bed  - Will reassess after pt ambulates  - Discussed possible transfusion with pt    3. Septic Ab  - Pt denied any foul smelling discharge, fundal tenderness  - Mildly elevated fever (100.8 Tmax)  - WBC fell to normal range following D&C  - Will likely d/c all antibiotic treatment today    Dispo: As patient meets appropriate post-op milestones, plan for discharge to home today.    KITTY Nielsen MD  OBGYN PGY1

## 2019-10-03 NOTE — ASSESSMENT & PLAN NOTE
"- pt with heavy vaginal bleeding  - hemodynamically stable, H/H 10/30 with VSS  - minimal abdominal tenderness, but WBC 15 and temp 99 in ED, with recent "flu-like" symptoms concerning for possible septic  vs incomplete   - hcg 73400, pt has not had u/s documenting IUP this pregnancy  - u/s today shows no IUP, 3cm endometrial stripe, possible corpus luteum cyst vs ectopic pregnancy in right adnexa  - to OR for suction D&C under ultrasound guidance; will also perform diagnostic laparoscopy to r/o ectopic pregnancy  - start antibiotics for possible septic   - blood transfusion and surgical consents signed  - house sup notified  - d/w staff on call, who agrees with above plan  "

## 2019-10-03 NOTE — DISCHARGE SUMMARY
Discharge Summary  Gynecology      Admit Date: 10/2/2019    Discharge Date and Time: 10/3/2019     Attending Physician: Julie R. Jeansonne, MD    Principal Diagnoses: Vaginal bleeding affecting early pregnancy    Active Hospital Problems    Diagnosis  POA    *Vaginal bleeding affecting early pregnancy [O20.8]  Yes    Status post D&C [Z98.890]  Not Applicable    Pregnancy of unknown anatomic location [Z34.90]  Not Applicable      Resolved Hospital Problems   No resolved problems to display.       Procedures: Procedure(s) (LRB):  DILATION AND CURETTAGE, UTERUS, USING SUCTION (N/A)  LAPAROSCOPY, DIAGNOSTIC (N/A)    Discharged Condition: good    Hospital Course: Patient presented through ED for unscheduled procedure. Patient was taken back to OR for suction D&C and laparoscopy. Please see OP note for further details. Tolerated procedure well and patient was taken to recovery in a stable condition. Prior to discharge patient was able to void, ambulate, tolerate PO and pain was well controlled with PO meds. Patient was given routine post-op instructions for which patient voiced understanding. Patient was subsequently discharged home.      Consults: None    Significant Diagnostic Studies:  Recent Labs   Lab 10/02/19  1704 10/03/19  0717   WBC 15.00* 8.35   HGB 10.3* 7.1*   HCT 30.1* 20.9*   MCV 87 87    155        Treatments:   1. Surgery as above    Disposition: Home or Self Care    Patient Instructions:   Current Discharge Medication List      START taking these medications    Details   ferrous sulfate (FEOSOL) 325 mg (65 mg iron) Tab tablet Take 1 tablet (325 mg total) by mouth once daily.  Qty: 30 tablet, Refills: 1      HYDROcodone-acetaminophen (NORCO) 5-325 mg per tablet Take 1 tablet by mouth every 6 (six) hours as needed for Pain.  Qty: 5 tablet, Refills: 0    Comments: Quantity prescribed more than 7 day supply? No      ibuprofen (ADVIL,MOTRIN) 600 MG tablet Take 1 tablet (600 mg total) by mouth 3  (three) times daily.  Qty: 30 tablet, Refills: 0         CONTINUE these medications which have NOT CHANGED    Details   hyoscyamine (ANASPAZ,LEVSIN) 0.125 mg Tab Take 1 tablet (125 mcg total) by mouth every 4 (four) hours as needed (abdominal cramping).  Qty: 30 tablet, Refills: 0    Associated Diagnoses: Abdominal cramping      nitrofurantoin (MACRODANTIN) 100 MG capsule Take 1 capsule (100 mg total) by mouth every 12 (twelve) hours. for 7 days  Qty: 14 capsule, Refills: 0    Associated Diagnoses: Dysuria; Cystitis      ondansetron (ZOFRAN) 4 MG tablet Take 1 tablet (4 mg total) by mouth every 8 (eight) hours as needed for Nausea.  Qty: 30 tablet, Refills: 0    Associated Diagnoses: Nausea             Discharge Procedure Orders   Other restrictions (specify):   Order Comments: Pelvic rest for 1-2 weeks. Nothing in vagina -no sex, tampons, douching, etc.    No baths (showers only) for 1-2 weeks     Notify your health care provider if you experience any of the following:  temperature >100.4     Notify your health care provider if you experience any of the following:  persistent nausea and vomiting or diarrhea     Notify your health care provider if you experience any of the following:  severe uncontrolled pain     Notify your health care provider if you experience any of the following:   Order Comments: Vaginal bleeding soaking 1-2 pads per hour for 2 or more hours    Foul smelling vaginal discharge       Follow-up Information     Julie R Jeansonne, MD. Schedule an appointment as soon as possible for a visit in 1 week.    Specialty:  Obstetrics and Gynecology  Why:  Follow up visit in 1 week  Contact information:  [address] [place] [address] [id number] [phone]               Pt will follow up in 1 week for follow up and possible repeat HCG depending upon path report.    KITTY Nielsen MD  OBGYN PGY1

## 2019-10-03 NOTE — ANESTHESIA PREPROCEDURE EVALUATION
10/02/2019  Tasia Segundo is a 29 y.o., female.    Anesthesia Evaluation    I have reviewed the Patient Summary Reports.    I have reviewed the Nursing Notes.   I have reviewed the Medications.     Review of Systems  Anesthesia Hx:  No problems with previous Anesthesia  Denies Family Hx of Anesthesia complications.   Denies Personal Hx of Anesthesia complications.   Social:  Non-Smoker    Hematology/Oncology:  Hematology Normal   Oncology Normal     EENT/Dental:EENT/Dental Normal   Cardiovascular:  Cardiovascular Normal     Pulmonary:  Pulmonary Normal    Renal/:  Renal/ Normal     Hepatic/GI:  Hepatic/GI Normal    Musculoskeletal:  Musculoskeletal Normal    OB/GYN/PEDS:  Possible ectopic   Neurological:  Neurology Normal    Endocrine:  Endocrine Normal    Dermatological:  Skin Normal    Psych:  Psychiatric Normal           Physical Exam  General:  Well nourished    Airway/Jaw/Neck:  Airway Findings: Mouth Opening: Normal Mallampati: II      Dental:  Dental Findings: In tact        Mental Status:  Mental Status Findings:  Cooperative, Alert and Oriented         Anesthesia Plan  Type of Anesthesia, risks & benefits discussed:  Anesthesia Type:  general  Patient's Preference:   Intra-op Monitoring Plan: standard ASA monitors  Intra-op Monitoring Plan Comments:   Post Op Pain Control Plan: multimodal analgesia  Post Op Pain Control Plan Comments:   Induction:   IV  Beta Blocker:         Informed Consent: Patient understands risks and agrees with Anesthesia plan.  Questions answered. Anesthesia consent signed with patient.  ASA Score: 2  emergent   Day of Surgery Review of History & Physical:    H&P update referred to the surgeon.         Ready For Surgery From Anesthesia Perspective.

## 2019-10-05 ENCOUNTER — TELEPHONE (OUTPATIENT)
Dept: URGENT CARE | Facility: CLINIC | Age: 29
End: 2019-10-05

## 2019-11-19 NOTE — PHYSICIAN QUERY
PT Name: Tasia Segundo  MR #: [id number]     Physician Query Form - Documentation Clarification      CDS/: Catrachita Gusman               Contact information:    This form is a permanent document in the medical record.     Query Date: 2019    By submitting this query, we are merely seeking further clarification of documentation. Please utilize your independent clinical judgment when addressing the question(s) below.    The Medical record reflects the following:    Supporting Clinical Findings Location in Medical Record       Dr. Valentin, please clarify in the discharge report if this was an  since products of conception were found in the path report.  Thank you!     Path report states products of conception were found.                                                                                      Doctor, Please specify diagnosis or diagnoses associated with above clinical findings.    Provider Use Only    I did not have the results of the path report when the DC summary was signed. Therefore, why does it need to be edited? I specifically stated that there was concern for a septic  in her presentation. I do not want to go back and edit a report that would falsify my medical decision making at that time because I did not know the results of the pathology.                                                                                                                             [  ] Clinically Undetermined

## (undated) DEVICE — NDL INSUF ULTRA VERESS 120MM

## (undated) DEVICE — CANNULA ENDOPATH XCEL 5X100MM

## (undated) DEVICE — CLIPPER BLADE MOD 4406 (CAREF)

## (undated) DEVICE — TIP RUMI WHITE DISP UMW676

## (undated) DEVICE — PAD ABD 8X10 STERILE

## (undated) DEVICE — SEE MEDLINE ITEM 152622

## (undated) DEVICE — STRIP STERI REIN CLSR 1/2X2IN

## (undated) DEVICE — PACK LAPAROSCOPY BAPTIST

## (undated) DEVICE — ELECTRODE REM PLYHSV RETURN 9

## (undated) DEVICE — SOL NS 1000CC

## (undated) DEVICE — SOL 9P NACL IRR PIC IL

## (undated) DEVICE — TIP RUMI MANIPULATOR LAVENDER

## (undated) DEVICE — GLOVE BIOGEL SKINSENSE PI 6.0

## (undated) DEVICE — KIT WING PAD POSITIONING

## (undated) DEVICE — TIP RUMI GREEN DISPOSABLE6.7MM

## (undated) DEVICE — GLOVE BIOGEL SKINSENSE PI 6.5

## (undated) DEVICE — TIP RUMI BLUE DISPOSABLE 5/BX

## (undated) DEVICE — SYR 10CC LUER LOCK

## (undated) DEVICE — JELLY SURGILUBE 5GR

## (undated) DEVICE — DRESSING LEUKOPLAST FLEX 1X3IN

## (undated) DEVICE — TROCAR ENDOPATH XCEL 5X100MM

## (undated) DEVICE — SOL CLEARIFY VISUALIZATION LAP

## (undated) DEVICE — PENCIL ELECTROSURG HOLST W/BLD

## (undated) DEVICE — SUT MCRYL PLUS 4-0 PS2 27IN